# Patient Record
Sex: MALE | Race: WHITE | NOT HISPANIC OR LATINO
[De-identification: names, ages, dates, MRNs, and addresses within clinical notes are randomized per-mention and may not be internally consistent; named-entity substitution may affect disease eponyms.]

---

## 2021-02-19 PROBLEM — Z00.00 ENCOUNTER FOR PREVENTIVE HEALTH EXAMINATION: Status: ACTIVE | Noted: 2021-02-19

## 2021-02-22 DIAGNOSIS — N40.2 NODULAR PROSTATE W/OUT LOWER URINARY TRACT SYMPTOMS: ICD-10-CM

## 2021-02-22 DIAGNOSIS — Z86.39 PERSONAL HISTORY OF OTHER ENDOCRINE, NUTRITIONAL AND METABOLIC DISEASE: ICD-10-CM

## 2021-02-22 DIAGNOSIS — Z87.438 PERSONAL HISTORY OF OTHER DISEASES OF MALE GENITAL ORGANS: ICD-10-CM

## 2021-02-22 DIAGNOSIS — Z87.442 PERSONAL HISTORY OF URINARY CALCULI: ICD-10-CM

## 2021-02-22 DIAGNOSIS — K57.90 DIVERTICULOSIS OF INTESTINE, PART UNSPECIFIED, W/OUT PERFORATION OR ABSCESS W/OUT BLEEDING: ICD-10-CM

## 2021-03-02 ENCOUNTER — APPOINTMENT (OUTPATIENT)
Dept: INTERNAL MEDICINE | Facility: CLINIC | Age: 76
End: 2021-03-02
Payer: MEDICARE

## 2021-03-02 VITALS
DIASTOLIC BLOOD PRESSURE: 90 MMHG | HEIGHT: 68 IN | SYSTOLIC BLOOD PRESSURE: 156 MMHG | BODY MASS INDEX: 28.19 KG/M2 | WEIGHT: 186 LBS

## 2021-03-02 PROCEDURE — 99072 ADDL SUPL MATRL&STAF TM PHE: CPT

## 2021-03-02 PROCEDURE — 36415 COLL VENOUS BLD VENIPUNCTURE: CPT

## 2021-03-02 PROCEDURE — G0439: CPT

## 2021-03-02 NOTE — PHYSICAL EXAM
[No JVD] : no jugular venous distention [No Lymphadenopathy] : no lymphadenopathy [Clear to Auscultation] : lungs were clear to auscultation bilaterally [Normal Rate] : normal rate  [Regular Rhythm] : with a regular rhythm [No Carotid Bruits] : no carotid bruits [No Edema] : there was no peripheral edema [Non Tender] : non-tender [No Joint Swelling] : no joint swelling [No Focal Deficits] : no focal deficits [Normal] : affect was normal and insight and judgment were intact

## 2021-03-02 NOTE — REVIEW OF SYSTEMS
[Joint Pain] : joint pain [Fever] : no fever [Chills] : no chills [Chest Pain] : no chest pain [Palpitations] : no palpitations [Lower Ext Edema] : no lower extremity edema [Orthopena] : no orthopnea [Shortness Of Breath] : no shortness of breath [Abdominal Pain] : no abdominal pain [Diarrhea] : no diarrhea [Dysuria] : no dysuria [Muscle Weakness] : no muscle weakness [Joint Swelling] : no joint swelling [Dizziness] : no dizziness [Fainting] : no fainting

## 2021-03-02 NOTE — HISTORY OF PRESENT ILLNESS
[de-identified] : 76 y/o male presents for annual Medicare wellness visit, follow-up of hypertension and for fasting labs.\par He has been generally well without any recent illness.  He is not been able to check his blood pressure at home.\par He has follow-up scheduled with Dr. Lawton for BPH and also needs a PSA done today.

## 2021-03-02 NOTE — PLAN
[FreeTextEntry1] : His exam is unremarkable and unchanged except for elevated blood pressure.\par Hypertension–his blood pressure has been somewhat elevated over the past year and at this point we will increase Accupril to 40 mg daily and he will either try to have it checked at home outside the office will follow up in 3 months that are rechecked.\par \par Mild diabetes–follow-up A1c was sent.  Weight loss and diet were discussed.  Medication options were discussed as well if needed in the future.\par \par History of BPH–follow-up PSA is sent.  He will follow-up with his urologist as scheduled.\par \par He had a negative colonoscopy in June 2015.

## 2021-03-03 LAB
ALBUMIN SERPL ELPH-MCNC: 4.5 G/DL
ALP BLD-CCNC: 128 U/L
ALT SERPL-CCNC: 43 U/L
ANION GAP SERPL CALC-SCNC: 11 MMOL/L
AST SERPL-CCNC: 32 U/L
BASOPHILS # BLD AUTO: 0.07 K/UL
BASOPHILS NFR BLD AUTO: 1.1 %
BILIRUB SERPL-MCNC: 0.6 MG/DL
BUN SERPL-MCNC: 17 MG/DL
CALCIUM SERPL-MCNC: 9.7 MG/DL
CHLORIDE SERPL-SCNC: 102 MMOL/L
CO2 SERPL-SCNC: 23 MMOL/L
CREAT SERPL-MCNC: 0.93 MG/DL
EOSINOPHIL # BLD AUTO: 0.18 K/UL
EOSINOPHIL NFR BLD AUTO: 2.7 %
GLUCOSE SERPL-MCNC: 149 MG/DL
HCT VFR BLD CALC: 47.2 %
HGB BLD-MCNC: 15.5 G/DL
IMM GRANULOCYTES NFR BLD AUTO: 2 %
LYMPHOCYTES # BLD AUTO: 2.2 K/UL
LYMPHOCYTES NFR BLD AUTO: 33.2 %
MAN DIFF?: NORMAL
MCHC RBC-ENTMCNC: 31.6 PG
MCHC RBC-ENTMCNC: 32.8 GM/DL
MCV RBC AUTO: 96.3 FL
MONOCYTES # BLD AUTO: 0.85 K/UL
MONOCYTES NFR BLD AUTO: 12.8 %
NEUTROPHILS # BLD AUTO: 3.19 K/UL
NEUTROPHILS NFR BLD AUTO: 48.2 %
PLATELET # BLD AUTO: 240 K/UL
POTASSIUM SERPL-SCNC: 4.4 MMOL/L
PROT SERPL-MCNC: 7.6 G/DL
PSA SERPL-MCNC: 3.1 NG/ML
RBC # BLD: 4.9 M/UL
RBC # FLD: 13.3 %
SODIUM SERPL-SCNC: 136 MMOL/L
WBC # FLD AUTO: 6.62 K/UL

## 2021-03-04 LAB
ESTIMATED AVERAGE GLUCOSE: 157 MG/DL
HBA1C MFR BLD HPLC: 7.1 %

## 2021-04-24 LAB
CHOLEST SERPL-MCNC: 200 MG/DL
HDLC SERPL-MCNC: 35 MG/DL
LDLC SERPL CALC-MCNC: 117 MG/DL
NONHDLC SERPL-MCNC: 164 MG/DL
TRIGL SERPL-MCNC: 238 MG/DL

## 2021-06-01 ENCOUNTER — APPOINTMENT (OUTPATIENT)
Dept: INTERNAL MEDICINE | Facility: CLINIC | Age: 76
End: 2021-06-01
Payer: MEDICARE

## 2021-06-01 VITALS
SYSTOLIC BLOOD PRESSURE: 148 MMHG | HEIGHT: 68 IN | BODY MASS INDEX: 28.19 KG/M2 | DIASTOLIC BLOOD PRESSURE: 82 MMHG | WEIGHT: 186 LBS

## 2021-06-01 VITALS — DIASTOLIC BLOOD PRESSURE: 84 MMHG | SYSTOLIC BLOOD PRESSURE: 138 MMHG

## 2021-06-01 DIAGNOSIS — Z87.19 PERSONAL HISTORY OF OTHER DISEASES OF THE DIGESTIVE SYSTEM: ICD-10-CM

## 2021-06-01 PROCEDURE — 99072 ADDL SUPL MATRL&STAF TM PHE: CPT

## 2021-06-01 PROCEDURE — 99214 OFFICE O/P EST MOD 30 MIN: CPT | Mod: 25

## 2021-06-01 PROCEDURE — 93000 ELECTROCARDIOGRAM COMPLETE: CPT

## 2021-06-01 NOTE — DATA REVIEWED
[FreeTextEntry1] : EKG revealed normal sinus rhythm at 85/minute.  Right bundle branch block.  No acute changes

## 2021-06-01 NOTE — REVIEW OF SYSTEMS
[Negative] : Heme/Lymph [Fever] : no fever [Chills] : no chills [Chest Pain] : no chest pain [Palpitations] : no palpitations [Lower Ext Edema] : no lower extremity edema [Shortness Of Breath] : no shortness of breath [Abdominal Pain] : no abdominal pain [Muscle Weakness] : no muscle weakness [Joint Swelling] : no joint swelling [Headache] : no headache [Dizziness] : no dizziness [Fainting] : no fainting

## 2021-06-01 NOTE — PLAN
[FreeTextEntry1] : His exam is unremarkable and unchanged.\par \par Hypertension–his blood pressure is well controlled since increasing his Accupril to 40 mg daily at his last visit.  He will remain on this for the time being without any changes.\par \par Early diabetes- his last A1c from 3/2/2021 was 7.1.  It has slowly crept up over the past few years.  It will be rechecked once again in the near future and if remains above 7 the idea of starting medication was discussed.\par \par BPH–his most recent PSA was 3.1 and he symptomatically has been stable.  He follows with with his urologist on a regular basis and requires no further intervention at present.\par \par Carpal tunnel repair–his exam is unremarkable as well as his EKG.  It did reveal a new right bundle branch block but is unremarkable otherwise.\par No medical contraindications to surgery.

## 2021-06-01 NOTE — PHYSICAL EXAM
[No Acute Distress] : no acute distress [No Lymphadenopathy] : no lymphadenopathy [Clear to Auscultation] : lungs were clear to auscultation bilaterally [Normal Rate] : normal rate  [Regular Rhythm] : with a regular rhythm [Normal S1, S2] : normal S1 and S2 [No Carotid Bruits] : no carotid bruits [No Edema] : there was no peripheral edema [Non Tender] : non-tender [No Joint Swelling] : no joint swelling [No Focal Deficits] : no focal deficits

## 2021-06-01 NOTE — HISTORY OF PRESENT ILLNESS
[de-identified] : 74 y/o presents for medical evaluation prior to carpal tunnel repair on 6 2.\par He has been generally well without any recent illness.

## 2021-08-11 ENCOUNTER — RX RENEWAL (OUTPATIENT)
Age: 76
End: 2021-08-11

## 2021-08-23 ENCOUNTER — APPOINTMENT (OUTPATIENT)
Dept: INTERNAL MEDICINE | Facility: CLINIC | Age: 76
End: 2021-08-23
Payer: MEDICARE

## 2021-08-23 VITALS — SYSTOLIC BLOOD PRESSURE: 158 MMHG | DIASTOLIC BLOOD PRESSURE: 92 MMHG

## 2021-08-23 VITALS — WEIGHT: 185 LBS | BODY MASS INDEX: 28.04 KG/M2 | HEIGHT: 68 IN

## 2021-08-23 PROCEDURE — 99214 OFFICE O/P EST MOD 30 MIN: CPT | Mod: 25

## 2021-08-23 PROCEDURE — 36415 COLL VENOUS BLD VENIPUNCTURE: CPT

## 2021-08-23 NOTE — REVIEW OF SYSTEMS
[Fever] : no fever [Chest Pain] : no chest pain [Palpitations] : no palpitations [Shortness Of Breath] : no shortness of breath [Joint Pain] : joint pain [Headache] : no headache [Dizziness] : no dizziness

## 2021-08-23 NOTE — HISTORY OF PRESENT ILLNESS
[de-identified] : 74 y/o presents for follow up and fasting labs.\par He has a history of hypertension, mild diabetes, hyperlipidemia and BPH.\par He has a follow-up appointment with his urologist and needs a follow-up PSA done as well.  He has been feeling generally well without any recent illness.  He does not check his blood pressure at home at all.  His Accupril was increased to 40 mg in 3/21.

## 2021-08-23 NOTE — ASSESSMENT
[FreeTextEntry1] : Hypertension–his pressure remains elevated and he is going to start taking Norvasc 5 mg daily along with Accupril 40 mg daily.  He is going to follow-up in 1 month for blood pressure check and he was also encouraged to get a home blood pressure monitoring unit and also monitor his pressure there as well.\par \par Diabetes–follow-up A1c is sent his previous level in 3/21 was 7.1.  He was told that if it continues to rise medication might be necessary.\par \par History of BPH–if follow-up PSA is sent.  His most recent one was 3.1.  He has a follow-up appoint with his urologist scheduled.\par \par Hyperlipidemia–follow-up lipid profile since his most recent cholesterol was 200 with an LDL of 117.  He is presently on no medication and he was told that starting a statin might be some to consider especially because of the diabetes although it is mild.\par \par He has not received the Covid vaccine and various issues and concerns were discussed.  He was encouraged to get vaccinated.

## 2021-08-25 LAB
ALBUMIN SERPL ELPH-MCNC: 4.7 G/DL
ALP BLD-CCNC: 144 U/L
ALT SERPL-CCNC: 43 U/L
ANION GAP SERPL CALC-SCNC: 11 MMOL/L
AST SERPL-CCNC: 35 U/L
BASOPHILS # BLD AUTO: 0.07 K/UL
BASOPHILS NFR BLD AUTO: 1 %
BILIRUB SERPL-MCNC: 0.7 MG/DL
BUN SERPL-MCNC: 19 MG/DL
CALCIUM SERPL-MCNC: 9.6 MG/DL
CHLORIDE SERPL-SCNC: 101 MMOL/L
CHOLEST SERPL-MCNC: 197 MG/DL
CO2 SERPL-SCNC: 22 MMOL/L
CREAT SERPL-MCNC: 0.92 MG/DL
EOSINOPHIL # BLD AUTO: 0.2 K/UL
EOSINOPHIL NFR BLD AUTO: 2.8 %
ESTIMATED AVERAGE GLUCOSE: 169 MG/DL
GLUCOSE SERPL-MCNC: 147 MG/DL
HBA1C MFR BLD HPLC: 7.5 %
HCT VFR BLD CALC: 45.6 %
HDLC SERPL-MCNC: 30 MG/DL
HGB BLD-MCNC: 15.4 G/DL
IMM GRANULOCYTES NFR BLD AUTO: 0.3 %
LDLC SERPL CALC-MCNC: 103 MG/DL
LYMPHOCYTES # BLD AUTO: 2.54 K/UL
LYMPHOCYTES NFR BLD AUTO: 35.9 %
MAN DIFF?: NORMAL
MCHC RBC-ENTMCNC: 31.6 PG
MCHC RBC-ENTMCNC: 33.8 GM/DL
MCV RBC AUTO: 93.4 FL
MONOCYTES # BLD AUTO: 0.75 K/UL
MONOCYTES NFR BLD AUTO: 10.6 %
NEUTROPHILS # BLD AUTO: 3.5 K/UL
NEUTROPHILS NFR BLD AUTO: 49.4 %
NONHDLC SERPL-MCNC: 167 MG/DL
PLATELET # BLD AUTO: 274 K/UL
POTASSIUM SERPL-SCNC: 4.4 MMOL/L
PROT SERPL-MCNC: 7.4 G/DL
PSA SERPL-MCNC: 3.31 NG/ML
RBC # BLD: 4.88 M/UL
RBC # FLD: 13.2 %
SODIUM SERPL-SCNC: 135 MMOL/L
TRIGL SERPL-MCNC: 317 MG/DL
WBC # FLD AUTO: 7.08 K/UL

## 2021-09-17 ENCOUNTER — RX RENEWAL (OUTPATIENT)
Age: 76
End: 2021-09-17

## 2021-09-21 ENCOUNTER — APPOINTMENT (OUTPATIENT)
Dept: INTERNAL MEDICINE | Facility: CLINIC | Age: 76
End: 2021-09-21
Payer: MEDICARE

## 2021-09-21 VITALS — OXYGEN SATURATION: 98 % | DIASTOLIC BLOOD PRESSURE: 78 MMHG | SYSTOLIC BLOOD PRESSURE: 122 MMHG | HEART RATE: 91 BPM

## 2021-09-21 PROCEDURE — 99213 OFFICE O/P EST LOW 20 MIN: CPT

## 2021-09-21 RX ORDER — TAMSULOSIN HYDROCHLORIDE 0.4 MG/1
0.4 CAPSULE ORAL
Qty: 90 | Refills: 1 | Status: ACTIVE | COMMUNITY
Start: 2021-09-21

## 2021-09-21 NOTE — HISTORY OF PRESENT ILLNESS
[de-identified] : 76-year-old male presents for follow-up of his blood pressure.  At his last visit Norvasc 5 mg was added and is here today for follow-up and a recheck.  He has been having no problems with the medication.\par He also states that he recently had follow-up with his urologist and was started on Flomax 0.4 mg daily for BPH symptoms and the fact that he was not emptying completely.

## 2021-09-21 NOTE — REVIEW OF SYSTEMS
[Fever] : no fever [Chest Pain] : no chest pain [Palpitations] : no palpitations [Lower Ext Edema] : no lower extremity edema [Headache] : no headache [Dizziness] : no dizziness [Fainting] : no fainting

## 2021-09-21 NOTE — PHYSICAL EXAM
[No Acute Distress] : no acute distress [No JVD] : no jugular venous distention [Clear to Auscultation] : lungs were clear to auscultation bilaterally [Normal Rate] : normal rate  [Regular Rhythm] : with a regular rhythm [No Edema] : there was no peripheral edema

## 2021-09-21 NOTE — ASSESSMENT
[FreeTextEntry1] : Hypertension–his blood pressure now is well controlled.  He is going to be switched to Lotrel 5/40 daily in place of Norvasc and Accupril separately.  He will monitor his pressure intermittently when she starts new medication to make sure it remains well controlled.\par \par BPH/LUTS–he had recent follow-up with his urologist.  His PSA has been stable and he is now taking Flomax 0.4 mg daily.

## 2022-03-07 ENCOUNTER — NON-APPOINTMENT (OUTPATIENT)
Age: 77
End: 2022-03-07

## 2022-03-07 ENCOUNTER — APPOINTMENT (OUTPATIENT)
Dept: INTERNAL MEDICINE | Facility: CLINIC | Age: 77
End: 2022-03-07
Payer: MEDICARE

## 2022-03-07 VITALS
SYSTOLIC BLOOD PRESSURE: 120 MMHG | DIASTOLIC BLOOD PRESSURE: 62 MMHG | BODY MASS INDEX: 27.89 KG/M2 | WEIGHT: 184 LBS | HEIGHT: 68 IN

## 2022-03-07 DIAGNOSIS — Z86.16 PERSONAL HISTORY OF COVID-19: ICD-10-CM

## 2022-03-07 PROCEDURE — G0439: CPT

## 2022-03-07 PROCEDURE — 36415 COLL VENOUS BLD VENIPUNCTURE: CPT

## 2022-03-07 NOTE — REVIEW OF SYSTEMS
[Back Pain] : back pain [Fever] : no fever [Chills] : no chills [Recent Change In Weight] : ~T no recent weight change [Palpitations] : no palpitations [Lower Ext Edema] : no lower extremity edema [Shortness Of Breath] : no shortness of breath [Dyspnea on Exertion] : not dyspnea on exertion [Abdominal Pain] : no abdominal pain [Headache] : no headache [Dizziness] : no dizziness

## 2022-03-07 NOTE — ASSESSMENT
[FreeTextEntry1] : His exam is essentially unremarkable and unchanged.–His blood pressure is well controlled and for now he will continue with Lotrel 5/40 daily.\par \par Diabetes–fasting labs/A1c was sent. He was told if he continues to rise or remains where it is then may consider starting low-dose medication.\par \par Hyperlipidemia–follow-up fasting labs was sent.\par \par BPH–PSA is sent and he has follow-up scheduled with his urologist in 1 month.\par \par Back pain/spinal stenosis–he will be following with his pain specialist.\par \par Recent COVID–he tested positive but was asymptomatic.

## 2022-03-07 NOTE — PHYSICAL EXAM
[No Acute Distress] : no acute distress [No JVD] : no jugular venous distention [Clear to Auscultation] : lungs were clear to auscultation bilaterally [Normal Rate] : normal rate  [Regular Rhythm] : with a regular rhythm [Normal S1, S2] : normal S1 and S2 [No Edema] : there was no peripheral edema [Non Tender] : non-tender [No Joint Swelling] : no joint swelling [No Focal Deficits] : no focal deficits [Alert and Oriented x3] : oriented to person, place, and time

## 2022-03-07 NOTE — HISTORY OF PRESENT ILLNESS
[de-identified] : 75 y/o male presents for annual Medicare wellness visit and follow-up fasting labs.\par He has a history of hypertension, BPH and more recently mild diabetes. His A1c has slowly risen and at his last visit it was 7.5. He is not presently on any medication and has been trying to control with diet. His blood pressure medication was changed to Lotrel 5/40 at his last visit and his blood pressure has been well controlled. Has been having issues with ongoing back pain secondary to spinal stenosis and is following with a pain specialist. He has received 1 epidural a few months ago. He also tested positive for Covid in 2/22 but was asymptomatic.

## 2022-03-08 LAB
ALBUMIN SERPL ELPH-MCNC: 4.3 G/DL
ALP BLD-CCNC: 128 U/L
ALT SERPL-CCNC: 59 U/L
ANION GAP SERPL CALC-SCNC: 14 MMOL/L
AST SERPL-CCNC: 53 U/L
BASOPHILS # BLD AUTO: 0.09 K/UL
BASOPHILS NFR BLD AUTO: 1.4 %
BILIRUB SERPL-MCNC: 0.6 MG/DL
BUN SERPL-MCNC: 21 MG/DL
CALCIUM SERPL-MCNC: 9.7 MG/DL
CHLORIDE SERPL-SCNC: 102 MMOL/L
CHOLEST SERPL-MCNC: 187 MG/DL
CO2 SERPL-SCNC: 21 MMOL/L
CREAT SERPL-MCNC: 0.94 MG/DL
EGFR: 83 ML/MIN/1.73M2
EOSINOPHIL # BLD AUTO: 0.19 K/UL
EOSINOPHIL NFR BLD AUTO: 3 %
ESTIMATED AVERAGE GLUCOSE: 148 MG/DL
GLUCOSE SERPL-MCNC: 131 MG/DL
HBA1C MFR BLD HPLC: 6.8 %
HCT VFR BLD CALC: 44.5 %
HDLC SERPL-MCNC: 34 MG/DL
HGB BLD-MCNC: 14.5 G/DL
IMM GRANULOCYTES NFR BLD AUTO: 0.5 %
LDLC SERPL CALC-MCNC: 101 MG/DL
LYMPHOCYTES # BLD AUTO: 2.1 K/UL
LYMPHOCYTES NFR BLD AUTO: 33.4 %
MAN DIFF?: NORMAL
MCHC RBC-ENTMCNC: 31.3 PG
MCHC RBC-ENTMCNC: 32.6 GM/DL
MCV RBC AUTO: 96.1 FL
MONOCYTES # BLD AUTO: 0.74 K/UL
MONOCYTES NFR BLD AUTO: 11.8 %
NEUTROPHILS # BLD AUTO: 3.13 K/UL
NEUTROPHILS NFR BLD AUTO: 49.9 %
NONHDLC SERPL-MCNC: 153 MG/DL
PLATELET # BLD AUTO: 246 K/UL
POTASSIUM SERPL-SCNC: 4.5 MMOL/L
PROT SERPL-MCNC: 7.3 G/DL
PSA SERPL-MCNC: 3.25 NG/ML
RBC # BLD: 4.63 M/UL
RBC # FLD: 13.4 %
SODIUM SERPL-SCNC: 136 MMOL/L
TRIGL SERPL-MCNC: 260 MG/DL
WBC # FLD AUTO: 6.28 K/UL

## 2022-03-10 ENCOUNTER — RX RENEWAL (OUTPATIENT)
Age: 77
End: 2022-03-10

## 2022-08-15 ENCOUNTER — RX RENEWAL (OUTPATIENT)
Age: 77
End: 2022-08-15

## 2022-10-18 ENCOUNTER — NON-APPOINTMENT (OUTPATIENT)
Age: 77
End: 2022-10-18

## 2022-10-18 ENCOUNTER — APPOINTMENT (OUTPATIENT)
Dept: INTERNAL MEDICINE | Facility: CLINIC | Age: 77
End: 2022-10-18

## 2022-10-18 VITALS
HEIGHT: 68 IN | WEIGHT: 185 LBS | BODY MASS INDEX: 28.04 KG/M2 | SYSTOLIC BLOOD PRESSURE: 130 MMHG | DIASTOLIC BLOOD PRESSURE: 68 MMHG

## 2022-10-18 PROCEDURE — 99214 OFFICE O/P EST MOD 30 MIN: CPT | Mod: 25

## 2022-10-18 PROCEDURE — 36415 COLL VENOUS BLD VENIPUNCTURE: CPT

## 2022-10-18 NOTE — ASSESSMENT
[FreeTextEntry1] : Hypertension–his blood pressure remains well controlled on Lotrel 5/40 daily.\par \par Hyperlipidemia–follow-up lipid profile was sent.  Not presently on medication.\par \par Diabetes–follow-up A1c was sent.  His most recent reading from 3/22 had dropped to 6.8 from 7.5.  He is not presently on medication.\par \par BPH–follow-up PSA was sent.  He is presently on Flomax 0.4 mg abhay  He does follow regularly with his urologist.

## 2022-10-18 NOTE — HISTORY OF PRESENT ILLNESS
[de-identified] : 78 y/o male presents for follow up and fasting labs.\par He has a history of hypertension, BPH and mild diabetes.\par Has been generally well without any recent illness.  He still complains of some residual fatigue from having had COVID in 2/22.

## 2022-10-19 LAB
ALBUMIN SERPL ELPH-MCNC: 4.4 G/DL
ALP BLD-CCNC: 120 U/L
ALT SERPL-CCNC: 54 U/L
ANION GAP SERPL CALC-SCNC: 12 MMOL/L
AST SERPL-CCNC: 41 U/L
BASOPHILS # BLD AUTO: 0.09 K/UL
BASOPHILS NFR BLD AUTO: 1.2 %
BILIRUB SERPL-MCNC: 0.7 MG/DL
BUN SERPL-MCNC: 23 MG/DL
CALCIUM SERPL-MCNC: 9.7 MG/DL
CHLORIDE SERPL-SCNC: 102 MMOL/L
CHOLEST SERPL-MCNC: 219 MG/DL
CO2 SERPL-SCNC: 23 MMOL/L
CREAT SERPL-MCNC: 1.04 MG/DL
EGFR: 73 ML/MIN/1.73M2
EOSINOPHIL # BLD AUTO: 0.23 K/UL
EOSINOPHIL NFR BLD AUTO: 3.2 %
ESTIMATED AVERAGE GLUCOSE: 171 MG/DL
GLUCOSE SERPL-MCNC: 136 MG/DL
HBA1C MFR BLD HPLC: 7.6 %
HCT VFR BLD CALC: 43.6 %
HDLC SERPL-MCNC: 33 MG/DL
HGB BLD-MCNC: 14.5 G/DL
IMM GRANULOCYTES NFR BLD AUTO: 0.5 %
LDLC SERPL CALC-MCNC: 122 MG/DL
LYMPHOCYTES # BLD AUTO: 2.2 K/UL
LYMPHOCYTES NFR BLD AUTO: 30.2 %
MAN DIFF?: NORMAL
MCHC RBC-ENTMCNC: 31.6 PG
MCHC RBC-ENTMCNC: 33.3 GM/DL
MCV RBC AUTO: 95 FL
MONOCYTES # BLD AUTO: 0.67 K/UL
MONOCYTES NFR BLD AUTO: 9.2 %
NEUTROPHILS # BLD AUTO: 4.06 K/UL
NEUTROPHILS NFR BLD AUTO: 55.7 %
NONHDLC SERPL-MCNC: 186 MG/DL
PLATELET # BLD AUTO: 250 K/UL
POTASSIUM SERPL-SCNC: 4.7 MMOL/L
PROT SERPL-MCNC: 7.7 G/DL
PSA SERPL-MCNC: 2.67 NG/ML
RBC # BLD: 4.59 M/UL
RBC # FLD: 13.5 %
SODIUM SERPL-SCNC: 136 MMOL/L
TRIGL SERPL-MCNC: 321 MG/DL
WBC # FLD AUTO: 7.29 K/UL

## 2022-11-14 ENCOUNTER — RX RENEWAL (OUTPATIENT)
Age: 77
End: 2022-11-14

## 2022-11-18 ENCOUNTER — APPOINTMENT (OUTPATIENT)
Dept: ORTHOPEDIC SURGERY | Facility: CLINIC | Age: 77
End: 2022-11-18

## 2022-11-18 VITALS — HEIGHT: 68 IN | WEIGHT: 185 LBS | BODY MASS INDEX: 28.04 KG/M2

## 2022-11-18 DIAGNOSIS — Z78.9 OTHER SPECIFIED HEALTH STATUS: ICD-10-CM

## 2022-11-18 PROCEDURE — 99204 OFFICE O/P NEW MOD 45 MIN: CPT

## 2022-11-20 PROBLEM — Z78.9 NON-SMOKER: Status: ACTIVE | Noted: 2022-11-18

## 2022-11-20 NOTE — PHYSICAL EXAM
[Flexion] : flexion [Extension] : extension [5___] : right extensor hallicus longus 5[unfilled]/5 [Normal Coordination] : normal coordination [Normal DTR UE/LE] : normal DTR UE/LE  [Normal Sensation] : normal sensation [Normal Mood and Affect] : normal mood and affect [Orientated] : orientated [Able to Communicate] : able to communicate [Normal Skin] : normal skin [No Rash] : no rash [No Ulcers] : no ulcers [No Lesions] : no lesions [No obvious lymphadenopathy in areas examined] : no obvious lymphadenopathy in areas examined [Well Developed] : well developed [Well Nourished] : well nourished [Peripheral vascular exam is grossly normal] : peripheral vascular exam is grossly normal [No Respiratory Distress] : no respiratory distress [de-identified] : Constitutional:\par - General Appearance:\par Unremarkable\par Body Habitus\par Well Developed\par Well Nourished\par Body Habitus\par No Deformities\par Well Groomed\par Ability To communicate:\par Normal\par Neurologic:\par Global sensation is intact to upper and lower extremities. See examination of Neck and/or Spine\par for exceptions.\par Orientation to Time, Place and Person is: Normal\par Mood And Affect is Normal\par Skin:\par - Head/Face, Right Upper/Lower Extremity, Left Upper/Lower Extremity: Normal\par See Examination of Neck and/or Spine for exceptions\par Cardiovascular:\par Peripheral Cardiovascular System is Normal\par Palpation of Lymph Nodes:\par Normal Palpation of lymph nodes in: Axilla, Cervical, Inguinal\par Abnormal Palpation of lymph nodes in: None  [] : non-antalgic

## 2022-11-20 NOTE — HISTORY OF PRESENT ILLNESS
[de-identified] : 11/18/2022 - The patient is a 77 year male who presents today for an initial evaluation. Chief complaint is back pain.  Complains of low back pain, buttock pain, left anterior thigh pain, and b/l hip pain with ambulation. Symptomatic for 2 years. Denies right sided symptoms. Difficulty lifting the left leg due to weakness and pain. Unable to ambulate > 10 minutes. General health is good. \par Formal treatment with LESI, no significant relief. Treatment with pt for 3 months was helpful, but no long standing relief. Taking blood pressure medications, controlled BP. \par \par Date of Injury/Onset: chronic\par Pain:    At Rest: 5/10 \par With Activity:  7/10 \par Quality of symptoms: achy, cramping\par Improves with: rest\par Worse with: walking, bending, standing\par Prior treatment: epidural 1 year ago\par Prior Imaging: [facility] lumbar 07/07/2021\par Additional Information: None\par \par \par \par \par \par

## 2022-11-20 NOTE — DISCUSSION/SUMMARY
[de-identified] : Discussed and reviewed results of lumbar MRI, and pathology of his symptoms associated with L4-5 stenosis and anterolisthesis. Discussed treatment with medications, possible interventional spine injections vs surgical decompression dependent upon his response to conservative treatments. I am prescribing the patient MDP for pain relief. Titration schedule provided. I am prescribing the patient Meloxicam (15mg x7-10 days then 7.5mg up to BID PRN) for pain relief. Titration Schedule Provided. Counseled patient to complete MDP before initiating treatment with Meloxicam. At his next visit, we will discuss possible pain management consult for L4-5 Epidural Spine Injections for pain relief. F/u in 4-6 weeks. \par \par Prior to appointment and during encounter with patient extensive medical records were reviewed including but not limited to, hospital records, outpatient records, imaging results, and lab data.During this appointment the patient was examined, diagnoses were discussed and explained in a face to face manner. In addition extensive time was spent reviewing aforementioned diagnostic studies. Counseling including abnormal image results, differential diagnoses, treatment options, risk and benefits, lifestyle changes, current condition, and current medications was performed. Patient's comments, questions, and concerns were addressed and patient verbalized understanding. Based on this patient's presentation at our office, which is an orthopedic spine surgeon's office, this patient inherently / intrinsically has a risk, however minute, of developing issues such as Cauda equina syndrome, bowel and bladder changes, or progression of motor or neurological deficits such as paralysis which may be permanent.\par \par NESS FERNANDEZ Acting as a Scribe for Dr. Elizondo\dimple I, Ness Fernandez, attest that this documentation has been prepared under the direction and in the presence of Provider Maury Elizondo MD. \par \par \par

## 2022-12-30 ENCOUNTER — APPOINTMENT (OUTPATIENT)
Dept: ORTHOPEDIC SURGERY | Facility: CLINIC | Age: 77
End: 2022-12-30
Payer: MEDICARE

## 2022-12-30 VITALS — HEIGHT: 68 IN | WEIGHT: 185 LBS | BODY MASS INDEX: 28.04 KG/M2

## 2022-12-30 PROCEDURE — 99214 OFFICE O/P EST MOD 30 MIN: CPT

## 2022-12-30 NOTE — DISCUSSION/SUMMARY
[de-identified] : 76 y/o male with L4-5 stenosis and anterolisthesis. Discussed treatment with medications, possible interventional spine injections vs surgical decompression dependent upon his response to conservative treatments, however, he seems to be trending in a less symptomatic direction. Patient given referral for pain management consult to discuss L4-5 Epidural Spine Injections for pain relief. I am prescribing the patient Meloxicam (15mg x7-10 days then 7.5mg up to BID PRN) for pain relief. Titration Schedule Provided. I advised the patient to incorporate the exercises taught during PT into his daily routine. F/U in 4 weeks. \par \par Prior to appointment and during encounter with patient extensive medical records were reviewed including but not limited to, hospital records, outpatient records, imaging results, and lab data.During this appointment the patient was examined, diagnoses were discussed and explained in a face to face manner. In addition extensive time was spent reviewing aforementioned diagnostic studies. Counseling including abnormal image results, differential diagnoses, treatment options, risk and benefits, lifestyle changes, current condition, and current medications was performed. Patient's comments, questions, and concerns were addressed and patient verbalized understanding. Based on this patient's presentation at our office, which is an orthopedic spine surgeon's office, this patient inherently / intrinsically has a risk, however minute, of developing issues such as Cauda equina syndrome, bowel and bladder changes, or progression of motor or neurological deficits such as paralysis which may be permanent.\par \par NESS FERNANDEZ Acting as a Scribe for Dr. Martel I, Ness Fernandez, attest that this documentation has been prepared under the direction and in the presence of Provider Maury Elizondo MD. \par \par \par

## 2022-12-30 NOTE — PHYSICAL EXAM
[Normal Coordination] : normal coordination [Normal DTR UE/LE] : normal DTR UE/LE  [Normal Sensation] : normal sensation [Normal Mood and Affect] : normal mood and affect [Orientated] : orientated [Able to Communicate] : able to communicate [Normal Skin] : normal skin [No Rash] : no rash [No Ulcers] : no ulcers [No Lesions] : no lesions [No obvious lymphadenopathy in areas examined] : no obvious lymphadenopathy in areas examined [Well Developed] : well developed [Well Nourished] : well nourished [Peripheral vascular exam is grossly normal] : peripheral vascular exam is grossly normal [No Respiratory Distress] : no respiratory distress [5___] : right extensor hallicus longus 5[unfilled]/5 [de-identified] : Constitutional:\par - General Appearance:\par Unremarkable\par Body Habitus\par Well Developed\par Well Nourished\par Body Habitus\par No Deformities\par Well Groomed\par Ability To communicate:\par Normal\par Neurologic:\par Global sensation is intact to upper and lower extremities. See examination of Neck and/or Spine\par for exceptions.\par Orientation to Time, Place and Person is: Normal\par Mood And Affect is Normal\par Skin:\par - Head/Face, Right Upper/Lower Extremity, Left Upper/Lower Extremity: Normal\par See Examination of Neck and/or Spine for exceptions\par Cardiovascular:\par Peripheral Cardiovascular System is Normal\par Palpation of Lymph Nodes:\par Normal Palpation of lymph nodes in: Axilla, Cervical, Inguinal\par Abnormal Palpation of lymph nodes in: None  [] : non-antalgic

## 2022-12-30 NOTE — HISTORY OF PRESENT ILLNESS
[Lower back] : lower back [4] : 4 [Meds] : meds [de-identified] : 12/30/2022 - 78 y/o male presenting for a follow up of lumbar spine. Significantly improved since his last visit after completing MDP. Chief complaint is limited rom in the left lower extremity due to stiffness/pain, however, he states resolution of his sciatic pains with ambulating. Not actively using antiinflammatories. He completed PT, and is completing the exercises from pt into his daily routine. No difficulty with ambulating long distances. \par \par 11/18/2022 - The patient is a 77 year male who presents today for an initial evaluation. Chief complaint is back pain.  Complains of low back pain, buttock pain, left anterior thigh pain, and b/l hip pain with ambulation. Symptomatic for 2 years. Denies right sided symptoms. Difficulty lifting the left leg due to weakness and pain. Unable to ambulate > 10 minutes. General health is good. \par Formal treatment with LESI, no significant relief. Treatment with pt for 3 months was helpful, but no long standing relief. Taking blood pressure medications, controlled BP. \par \par Date of Injury/Onset: chronic\par Pain:    At Rest: 5/10 \par With Activity:  7/10 \par Quality of symptoms: achy, cramping\par Improves with: rest\par Worse with: walking, bending, standing\par Prior treatment: epidural 1 year ago\par Prior Imaging: [facility] lumbar 07/07/2021\par Additional Information: None\par \par \par \par \par \par  [de-identified] : M

## 2023-02-01 ENCOUNTER — APPOINTMENT (OUTPATIENT)
Dept: ORTHOPEDIC SURGERY | Facility: CLINIC | Age: 78
End: 2023-02-01
Payer: MEDICARE

## 2023-02-01 VITALS — HEIGHT: 68 IN | WEIGHT: 185 LBS | BODY MASS INDEX: 28.04 KG/M2

## 2023-02-01 PROCEDURE — 99214 OFFICE O/P EST MOD 30 MIN: CPT

## 2023-02-01 PROCEDURE — 73503 X-RAY EXAM HIP UNI 4/> VIEWS: CPT | Mod: LT

## 2023-02-01 NOTE — DISCUSSION/SUMMARY
[de-identified] : 78 y/o male with L4-5 stenosis and anterolisthesis. I discussed with the patient at length there is likely a component of both hip and lumbar pathology contributing to symptom complex. Referral given for consult with Dr. Mensah for evaluation of left hip, candidacy to [facility]. Discussed possible L4-5 Epidural Spine Injections for pain relief dependent upon updated MRI results. I am requesting an updated lumbar spine MRI to evaluate for stenosis, last study is approx 2 years old, progressive left radicular symptoms refractory to Meloxicam, MDP, chiropractic therapy, physical therapy over months duration. Patient will follow up after the study is complete. \par \par Prior to appointment and during encounter with patient extensive medical records were reviewed including but not limited to, hospital records, outpatient records, imaging results, and lab data.During this appointment the patient was examined, diagnoses were discussed and explained in a face to face manner. In addition extensive time was spent reviewing aforementioned diagnostic studies. Counseling including abnormal image results, differential diagnoses, treatment options, risk and benefits, lifestyle changes, current condition, and current medications was performed. Patient's comments, questions, and concerns were addressed and patient verbalized understanding. Based on this patient's presentation at our office, which is an orthopedic spine surgeon's office, this patient inherently / intrinsically has a risk, however minute, of developing issues such as Cauda equina syndrome, bowel and bladder changes, or progression of motor or neurological deficits such as paralysis which may be permanent.\par \par NESS FERNANDEZ Acting as a Scribe for Dr. Delonte CARRANZA, Ness Fernandez, attest that this documentation has been prepared under the direction and in the presence of Provider Maury Elizondo MD. \par \par \par

## 2023-02-01 NOTE — DATA REVIEWED
[MRI] : MRI [Lumbar Spine] : lumbar spine [Report was reviewed and noted in the chart] : The report was reviewed and noted in the chart [I independently reviewed and interpreted images and report] : I independently reviewed and interpreted images and report [I reviewed the films/CD and additionally noted] : I reviewed the films/CD and additionally noted [FreeTextEntry2] : 2020: L4-5 spondylolisthesis , lateral recess stenosis [FreeTextEntry1] : I stop paperwork reviewed

## 2023-02-01 NOTE — PHYSICAL EXAM
[Normal Coordination] : normal coordination [Normal DTR UE/LE] : normal DTR UE/LE  [Normal Sensation] : normal sensation [Normal Mood and Affect] : normal mood and affect [Orientated] : orientated [Able to Communicate] : able to communicate [Normal Skin] : normal skin [No Rash] : no rash [No Ulcers] : no ulcers [No Lesions] : no lesions [No obvious lymphadenopathy in areas examined] : no obvious lymphadenopathy in areas examined [Well Developed] : well developed [Well Nourished] : well nourished [Peripheral vascular exam is grossly normal] : peripheral vascular exam is grossly normal [No Respiratory Distress] : no respiratory distress [5___] : right extensor hallicus longus 5[unfilled]/5 [Left] : left hip with pelvis [de-identified] : Constitutional:\par - General Appearance:\par Unremarkable\par Body Habitus\par Well Developed\par Well Nourished\par Body Habitus\par No Deformities\par Well Groomed\par Ability To communicate:\par Normal\par Neurologic:\par Global sensation is intact to upper and lower extremities. See examination of Neck and/or Spine\par for exceptions.\par Orientation to Time, Place and Person is: Normal\par Mood And Affect is Normal\par Skin:\par - Head/Face, Right Upper/Lower Extremity, Left Upper/Lower Extremity: Normal\par See Examination of Neck and/or Spine for exceptions\par Cardiovascular:\par Peripheral Cardiovascular System is Normal\par Palpation of Lymph Nodes:\par Normal Palpation of lymph nodes in: Axilla, Cervical, Inguinal\par Abnormal Palpation of lymph nodes in: None  [] : non-antalgic [FreeTextEntry9] : Significant arthritis

## 2023-02-01 NOTE — HISTORY OF PRESENT ILLNESS
[Lower back] : lower back [4] : 4 [Meds] : meds [de-identified] : 2/1/2023 - 76 y/o M presenting for a follow up of lumbar spine. His chief complaint is progressive anterior left radiating thigh pain, stopping at the left knee. Increased pain while standing, no pain sitting. No right sided lower extremity symptoms. Chiropractor every few weeks. Pain level is about a 6-7/10. Stopped taking Meloxicam due to side affects. He has taken Advil at nighttime, difficulty with sleeping. No changes in lower extremity strength. Limited by bending over due to back pain. \par \par 12/30/2022 - 76 y/o male presenting for a follow up of lumbar spine. Significantly improved since his last visit after completing MDP. Chief complaint is limited rom in the left lower extremity due to stiffness/pain, however, he states resolution of his sciatic pains with ambulating. Not actively using antiinflammatories. He completed PT, and is completing the exercises from pt into his daily routine. No difficulty with ambulating long distances. \par \par 11/18/2022 - The patient is a 77 year male who presents today for an initial evaluation. Chief complaint is back pain.  Complains of low back pain, buttock pain, left anterior thigh pain, and b/l hip pain with ambulation. Symptomatic for 2 years. Denies right sided symptoms. Difficulty lifting the left leg due to weakness and pain. Unable to ambulate > 10 minutes. General health is good. \par Formal treatment with LESI, no significant relief. Treatment with pt for 3 months was helpful, but no long standing relief. Taking blood pressure medications, controlled BP. \par \par Date of Injury/Onset: chronic\par Pain:    At Rest: 5/10 \par With Activity:  7/10 \par Quality of symptoms: achy, cramping\par Improves with: rest\par Worse with: walking, bending, standing\par Prior treatment: epidural 1 year ago\par Prior Imaging: [facility] 07/07/2021\par Additional Information: None\par \par \par \par \par \par

## 2023-02-06 ENCOUNTER — FORM ENCOUNTER (OUTPATIENT)
Age: 78
End: 2023-02-06

## 2023-02-07 ENCOUNTER — APPOINTMENT (OUTPATIENT)
Dept: MRI IMAGING | Facility: CLINIC | Age: 78
End: 2023-02-07
Payer: MEDICARE

## 2023-02-07 PROCEDURE — 72148 MRI LUMBAR SPINE W/O DYE: CPT

## 2023-02-09 ENCOUNTER — APPOINTMENT (OUTPATIENT)
Dept: ORTHOPEDIC SURGERY | Facility: CLINIC | Age: 78
End: 2023-02-09
Payer: MEDICARE

## 2023-02-09 PROCEDURE — 99214 OFFICE O/P EST MOD 30 MIN: CPT

## 2023-02-09 RX ORDER — MELOXICAM 15 MG/1
15 TABLET ORAL
Qty: 30 | Refills: 1 | Status: ACTIVE | COMMUNITY
Start: 2023-02-09 | End: 1900-01-01

## 2023-02-09 NOTE — HISTORY OF PRESENT ILLNESS
[Retired] : Work status: retired [Gradual] : gradual [8] : 8 [5] : 5 [Dull/Aching] : dull/aching [Radiating] : radiating [Frequent] : frequent [Household chores] : household chores [Rest] : rest [Meds] : meds [Walking] : walking [Stairs] : stairs [de-identified] : 77M p/w L hip pain. Pain for a few years, complains of anterior hip and thigh pain, limited ROM, trouble with shoes and socks. Has not had any recent PT or NSAIDs, not sure if he would like any surgical intervention at this point. [] : no [FreeTextEntry1] : Left hip  [FreeTextEntry5] : on going pain [FreeTextEntry7] : L-spine  [FreeTextEntry9] : Ibuprofen  [de-identified] : 2/1/23 [de-identified] : Dr Elizondo  [de-identified] : x-rays [facility]

## 2023-02-09 NOTE — ASSESSMENT
[FreeTextEntry1] : 77M p/w adv L hip OA\par \par Begin PT\par Meloxicam for pain\par return 6-8 weeks\par \par The natural progression of Osteoarthritis was explained to the patient. We discussed the possible treatment options from conservative to operative. These included NSAIDS, Glucosamine and Chondrotin sulfate, and Physical Therapy. We also discussed that at some point they may progress to needed a LUCILA. Information and pamphlets were given.\par

## 2023-02-17 ENCOUNTER — APPOINTMENT (OUTPATIENT)
Dept: ORTHOPEDIC SURGERY | Facility: CLINIC | Age: 78
End: 2023-02-17
Payer: MEDICARE

## 2023-02-17 VITALS — BODY MASS INDEX: 28.04 KG/M2 | WEIGHT: 185 LBS | HEIGHT: 68 IN

## 2023-02-17 PROCEDURE — 99214 OFFICE O/P EST MOD 30 MIN: CPT

## 2023-02-17 NOTE — DISCUSSION/SUMMARY
[de-identified] : 76 y/o male with stenosis and anterolisthesis. Hip and lumbar pathology contributing to symptom complex, c/t to follow up with Dr. Mensah for evaluation of left hip, possible hip injection, candidacy to LUCILA. At this point, he seems to be trending in a less symptomatic direction with his current PT trial and ibuprofen as needed, will continue this course of treatment with supervised physical therapy. However, we discussed possible L4-5 Epidural Spine Injections for pain relief dependent upon if there is a return of radicular leg pain. I would like to follow up with the patient in 5-6 weeks to asses his response to PT, will discuss possible PM consult at next visit. \par \par Prior to appointment and during encounter with patient extensive medical records were reviewed including but not limited to, hospital records, outpatient records, imaging results, and lab data.During this appointment the patient was examined, diagnoses were discussed and explained in a face to face manner. In addition extensive time was spent reviewing aforementioned diagnostic studies. Counseling including abnormal image results, differential diagnoses, treatment options, risk and benefits, lifestyle changes, current condition, and current medications was performed. Patient's comments, questions, and concerns were addressed and patient verbalized understanding. Based on this patient's presentation at our office, which is an orthopedic spine surgeon's office, this patient inherently / intrinsically has a risk, however minute, of developing issues such as Cauda equina syndrome, bowel and bladder changes, or progression of motor or neurological deficits such as paralysis which may be permanent.\par \par NESS FERNANDEZ Acting as a Scribe for Dr. Elizondo\dimple I, Ness Fernandez, attest that this documentation has been prepared under the direction and in the presence of Provider Maury Elizondo MD. \par \par \par

## 2023-02-17 NOTE — HISTORY OF PRESENT ILLNESS
[Lower back] : lower back [4] : 4 [Meds] : meds [de-identified] : 02/17/2023 - 78 y/o M presenting for a test follow up of L spine. States transient relief of symptoms after completing two sessions of PT, significantly helpful. He states his leg pain was significant until starting PT. Medicine wise, he is occasionally using ibuprofen for symptom control at nighttime. \par \par 2/1/2023 - 78 y/o M presenting for a follow up of lumbar spine. His chief complaint is progressive anterior left radiating thigh pain, stopping at the left knee. Increased pain while standing, no pain sitting. No right sided lower extremity symptoms. Chiropractor every few weeks. Pain level is about a 6-7/10. Stopped taking Meloxicam due to side affects. He has taken Advil at nighttime, difficulty with sleeping. No changes in lower extremity strength. Limited by bending over due to back pain. \par \par 12/30/2022 - 78 y/o male presenting for a follow up of lumbar spine. Significantly improved since his last visit after completing MDP. Chief complaint is limited rom in the left lower extremity due to stiffness/pain, however, he states resolution of his sciatic pains with ambulating. Not actively using antiinflammatories. He completed PT, and is completing the exercises from pt into his daily routine. No difficulty with ambulating long distances. \par \par 11/18/2022 - The patient is a 77 year male who presents today for an initial evaluation. Chief complaint is back pain.  Complains of low back pain, buttock pain, left anterior thigh pain, and b/l hip pain with ambulation. Symptomatic for 2 years. Denies right sided symptoms. Difficulty lifting the left leg due to weakness and pain. Unable to ambulate > 10 minutes. General health is good. \par Formal treatment with LESI, no significant relief. Treatment with pt for 3 months was helpful, but no long standing relief. Taking blood pressure medications, controlled BP. \par \par Date of Injury/Onset: chronic\par Pain:    At Rest: 5/10 \par With Activity:  7/10 \par Quality of symptoms: achy, cramping\par Improves with: rest\par Worse with: walking, bending, standing\par Prior treatment: epidural 1 year ago\par Prior Imaging: [facility] 07/07/2021\par Additional Information: None\par \par \par \par \par \par

## 2023-02-17 NOTE — DATA REVIEWED
[Lumbar Spine] : lumbar spine [Report was reviewed and noted in the chart] : The report was reviewed and noted in the chart [I independently reviewed and interpreted images and report] : I independently reviewed and interpreted images and report [I reviewed the films/CD and additionally noted] : I reviewed the films/CD and additionally noted [FreeTextEntry1] : [facility] (02/07/2023) - L4-5 spondylolisthesis, facet arthrosis, mild/mod stenosis. L3-4: mild/mod stenosis.

## 2023-02-17 NOTE — PHYSICAL EXAM
[Normal Coordination] : normal coordination [Normal DTR UE/LE] : normal DTR UE/LE  [Normal Sensation] : normal sensation [Normal Mood and Affect] : normal mood and affect [Orientated] : orientated [Able to Communicate] : able to communicate [Normal Skin] : normal skin [No Rash] : no rash [No Ulcers] : no ulcers [No Lesions] : no lesions [No obvious lymphadenopathy in areas examined] : no obvious lymphadenopathy in areas examined [Well Developed] : well developed [Well Nourished] : well nourished [Peripheral vascular exam is grossly normal] : peripheral vascular exam is grossly normal [No Respiratory Distress] : no respiratory distress [5___] : right extensor hallicus longus 5[unfilled]/5 [Left] : left hip with pelvis [de-identified] : Constitutional:\par - General Appearance:\par Unremarkable\par Body Habitus\par Well Developed\par Well Nourished\par Body Habitus\par No Deformities\par Well Groomed\par Ability To communicate:\par Normal\par Neurologic:\par Global sensation is intact to upper and lower extremities. See examination of Neck and/or Spine\par for exceptions.\par Orientation to Time, Place and Person is: Normal\par Mood And Affect is Normal\par Skin:\par - Head/Face, Right Upper/Lower Extremity, Left Upper/Lower Extremity: Normal\par See Examination of Neck and/or Spine for exceptions\par Cardiovascular:\par Peripheral Cardiovascular System is Normal\par Palpation of Lymph Nodes:\par Normal Palpation of lymph nodes in: Axilla, Cervical, Inguinal\par Abnormal Palpation of lymph nodes in: None  [] : non-antalgic [FreeTextEntry9] : Significant arthritis

## 2023-03-13 ENCOUNTER — NON-APPOINTMENT (OUTPATIENT)
Age: 78
End: 2023-03-13

## 2023-03-13 ENCOUNTER — APPOINTMENT (OUTPATIENT)
Dept: INTERNAL MEDICINE | Facility: CLINIC | Age: 78
End: 2023-03-13
Payer: MEDICARE

## 2023-03-13 VITALS — BODY MASS INDEX: 28.43 KG/M2 | DIASTOLIC BLOOD PRESSURE: 78 MMHG | WEIGHT: 187 LBS | SYSTOLIC BLOOD PRESSURE: 128 MMHG

## 2023-03-13 DIAGNOSIS — N40.0 BENIGN PROSTATIC HYPERPLASIA WITHOUT LOWER URINARY TRACT SYMPMS: ICD-10-CM

## 2023-03-13 PROCEDURE — G0439: CPT

## 2023-03-13 PROCEDURE — 36415 COLL VENOUS BLD VENIPUNCTURE: CPT | Mod: 59

## 2023-03-13 RX ORDER — QUINAPRIL HYDROCHLORIDE 40 MG/1
40 TABLET, FILM COATED ORAL DAILY
Qty: 90 | Refills: 1 | Status: DISCONTINUED | COMMUNITY
Start: 2021-02-22 | End: 2023-03-13

## 2023-03-13 RX ORDER — AMLODIPINE BESYLATE 5 MG/1
5 TABLET ORAL DAILY
Qty: 90 | Refills: 0 | Status: DISCONTINUED | COMMUNITY
Start: 2021-08-23 | End: 2023-03-13

## 2023-03-13 NOTE — REVIEW OF SYSTEMS
[Joint Pain] : joint pain [Joint Stiffness] : joint stiffness [Back Pain] : back pain [Fever] : no fever [Chest Pain] : no chest pain [Palpitations] : no palpitations [Lower Ext Edema] : no lower extremity edema [Shortness Of Breath] : no shortness of breath [Dyspnea on Exertion] : not dyspnea on exertion [Dysuria] : no dysuria [Headache] : no headache [Dizziness] : no dizziness

## 2023-03-13 NOTE — PHYSICAL EXAM
[No Acute Distress] : no acute distress [No JVD] : no jugular venous distention [Clear to Auscultation] : lungs were clear to auscultation bilaterally [Normal Rate] : normal rate  [Regular Rhythm] : with a regular rhythm [Normal S1, S2] : normal S1 and S2 [No Murmur] : no murmur heard [No Edema] : there was no peripheral edema [Non Tender] : non-tender [Grossly Normal Strength/Tone] : grossly normal strength/tone [No Focal Deficits] : no focal deficits [Alert and Oriented x3] : oriented to person, place, and time

## 2023-03-13 NOTE — ASSESSMENT
[FreeTextEntry1] : Hypertension–his blood pressure remains well controlled and he will continue Lotrel 5/40 daily.\par \par Hyperlipidemia–follow-up fasting labs/lipid profile was sent.  He is not presently on medication.\par \par Diabetes–follow-up A1c was sent.  He is not on medication at present.  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  He remains on Flomax 0.4 mg daily.\par A follow-up PSA was sent.\par \par Left hip arthritis–he does have a follow-up scheduled with Dr. Elizondo and he is considering hip replacement.  He was told that from medical standpoint there would be no reason not to have it done.

## 2023-03-13 NOTE — HISTORY OF PRESENT ILLNESS
[de-identified] : 77-year-old male presents for annual Medicare wellness visit as well as follow-up fasting labs and prescription renewals.\par He has a history of hypertension, mild hyperlipidemia, early diabetes and BPH/PIN.  Also has been having ongoing issues with left hip arthritis and is contemplating hip replacement.  Also has spinal stenosis and does follow with his pain specialist.\par Has been generally well without any recent illness.\par He does remain active.

## 2023-03-13 NOTE — HEALTH RISK ASSESSMENT
[No] : In the past 12 months have you used drugs other than those required for medical reasons? No [No falls in past year] : Patient reported no falls in the past year [0] : 2) Feeling down, depressed, or hopeless: Not at all (0) [PHQ-2 Negative - No further assessment needed] : PHQ-2 Negative - No further assessment needed [Never] : Never [[id number]] : 0

## 2023-03-14 LAB
ALBUMIN SERPL ELPH-MCNC: 4.5 G/DL
ALP BLD-CCNC: 109 U/L
ALT SERPL-CCNC: 46 U/L
ANION GAP SERPL CALC-SCNC: 13 MMOL/L
AST SERPL-CCNC: 38 U/L
BASOPHILS # BLD AUTO: 0.1 K/UL
BASOPHILS NFR BLD AUTO: 1.5 %
BILIRUB SERPL-MCNC: 0.6 MG/DL
BUN SERPL-MCNC: 23 MG/DL
CALCIUM SERPL-MCNC: 9.8 MG/DL
CHLORIDE SERPL-SCNC: 101 MMOL/L
CHOLEST SERPL-MCNC: 203 MG/DL
CO2 SERPL-SCNC: 23 MMOL/L
CREAT SERPL-MCNC: 1.07 MG/DL
EGFR: 71 ML/MIN/1.73M2
EOSINOPHIL # BLD AUTO: 0.34 K/UL
EOSINOPHIL NFR BLD AUTO: 4.9 %
ESTIMATED AVERAGE GLUCOSE: 169 MG/DL
GLUCOSE SERPL-MCNC: 151 MG/DL
HBA1C MFR BLD HPLC: 7.5 %
HCT VFR BLD CALC: 46.5 %
HDLC SERPL-MCNC: 33 MG/DL
HGB BLD-MCNC: 15 G/DL
IMM GRANULOCYTES NFR BLD AUTO: 0.4 %
LDLC SERPL CALC-MCNC: 111 MG/DL
LYMPHOCYTES # BLD AUTO: 2.09 K/UL
LYMPHOCYTES NFR BLD AUTO: 30.4 %
MAN DIFF?: NORMAL
MCHC RBC-ENTMCNC: 31.7 PG
MCHC RBC-ENTMCNC: 32.3 GM/DL
MCV RBC AUTO: 98.3 FL
MONOCYTES # BLD AUTO: 0.75 K/UL
MONOCYTES NFR BLD AUTO: 10.9 %
NEUTROPHILS # BLD AUTO: 3.56 K/UL
NEUTROPHILS NFR BLD AUTO: 51.9 %
NONHDLC SERPL-MCNC: 171 MG/DL
PLATELET # BLD AUTO: 267 K/UL
POTASSIUM SERPL-SCNC: 4.8 MMOL/L
PROT SERPL-MCNC: 7.8 G/DL
PSA SERPL-MCNC: 3 NG/ML
RBC # BLD: 4.73 M/UL
RBC # FLD: 13.3 %
SODIUM SERPL-SCNC: 137 MMOL/L
TRIGL SERPL-MCNC: 300 MG/DL
WBC # FLD AUTO: 6.87 K/UL

## 2023-03-31 ENCOUNTER — APPOINTMENT (OUTPATIENT)
Dept: ORTHOPEDIC SURGERY | Facility: CLINIC | Age: 78
End: 2023-03-31
Payer: MEDICARE

## 2023-03-31 VITALS — HEIGHT: 68 IN | BODY MASS INDEX: 28.34 KG/M2 | WEIGHT: 187 LBS

## 2023-03-31 DIAGNOSIS — M48.061 SPINAL STENOSIS, LUMBAR REGION WITHOUT NEUROGENIC CLAUDICATION: ICD-10-CM

## 2023-03-31 PROCEDURE — 99214 OFFICE O/P EST MOD 30 MIN: CPT

## 2023-03-31 NOTE — DATA REVIEWED
[MRI] : MRI [Lumbar Spine] : lumbar spine [Report was reviewed and noted in the chart] : The report was reviewed and noted in the chart [Outside X-rays] : outside x-rays [Bilateral] : of the bilateral [Hip] : hip [I independently reviewed and interpreted images and report] : I independently reviewed and interpreted images and report [I reviewed the films/CD and additionally noted] : I reviewed the films/CD and additionally noted [FreeTextEntry2] : severe osteoarthritis left hip [FreeTextEntry1] : Adult reconstruction progress notes reviewed

## 2023-03-31 NOTE — HISTORY OF PRESENT ILLNESS
[de-identified] : 3/31/23- Patient presenting for a follow up of L spine. He reports increased severity of pain since his last visit. Chief complaint is radicular pain anterior region of LT thigh. Taking ibuprofen q.d for symptom control. C/t to follow up with  for left hip. \par Patient reports going to 6x PT session with no relief, continues to experience pain in his left leg. He reports zero relief from  L4/5 DONITA. \par Pain: At Rest: 2-3/10 \par With Activity: 6/10 \par \par 02/17/2023 - 78 y/o M presenting for a test follow up of L spine. States transient relief of symptoms after completing two sessions of PT, significantly helpful. He states his leg pain was significant until starting PT. Medicine wise, he is occasionally using ibuprofen for symptom control at nighttime. \par \par 2/1/2023 - 78 y/o M presenting for a follow up of lumbar spine. His chief complaint is progressive anterior left radiating thigh pain, stopping at the left knee. Increased pain while standing, no pain sitting. No right sided lower extremity symptoms. Chiropractor every few weeks. Pain level is about a 6-7/10. Stopped taking Meloxicam due to side affects. He has taken Advil at nighttime, difficulty with sleeping. No changes in lower extremity strength. Limited by bending over due to back pain. \par \par 12/30/2022 - 78 y/o male presenting for a follow up of lumbar spine. Significantly improved since his last visit after completing MDP. Chief complaint is limited rom in the left lower extremity due to stiffness/pain, however, he states resolution of his sciatic pains with ambulating. Not actively using antiinflammatories. He completed PT, and is completing the exercises from pt into his daily routine. No difficulty with ambulating long distances. \par \par 11/18/2022 - The patient is a 77 year male who presents today for an initial evaluation. Chief complaint is back pain. Complains of low back pain, buttock pain, left anterior thigh pain, and b/l hip pain with ambulation. Symptomatic for 2 years. Denies right sided symptoms. Difficulty lifting the left leg due to weakness and pain. Unable to ambulate > 10 minutes. General health is good. \par Formal treatment with LESI, no significant relief. Treatment with pt for 3 months was helpful, but no long standing relief. Taking blood pressure medications, controlled BP. \par \par Date of Injury/Onset: chronic\par Pain: At Rest: 5/10 \par With Activity: 7/10 \par Quality of symptoms: achy, cramping\par Improves with: rest\par Worse with: walking, bending, standing\par Prior treatment: epidural 1 year ago\par Prior Imaging: [facility] 07/07/2021\par Additional Information: None\par

## 2023-03-31 NOTE — DISCUSSION/SUMMARY
[de-identified] : 78 y/o male with stenosis and anterolisthesis. Hip and lumbar pathology contributing to symptom complex, c/t to follow up with Dr. Mensah for evaluation of left hip, possible hip injection, candidacy to LUCILA. He reports zero relief following L4-5 Epidural Spine Injection, we discussed intraarticular left hip injection as next course of treatment. He was provided with a referral for PM consult. I think his anterior thigh pain is likely to be generated from hip OA and would anticipate some short term relief from intra articular hoip injection and suggested that if so he should consider LUCILA surgery.\par \par Prior to appointment and during encounter with patient extensive medical records were reviewed including but not limited to, hospital records, outpatient records, imaging results, and lab data.During this appointment the patient was examined, diagnoses were discussed and explained in a face to face manner. In addition extensive time was spent reviewing aforementioned diagnostic studies. Counseling including abnormal image results, differential diagnoses, treatment options, risk and benefits, lifestyle changes, current condition, and current medications was performed. Patient's comments, questions, and concerns were addressed and patient verbalized understanding. Based on this patient's presentation at our office, which is an orthopedic spine surgeon's office, this patient inherently / intrinsically has a risk, however minute, of developing issues such as Cauda equina syndrome, bowel and bladder changes, or progression of motor or neurological deficits such as paralysis which may be permanent.\par \par NESS SOSA Acting as a Scribe for Ness Ramirez, attest that this documentation has been prepared under the direction and in the presence of Provider Maury Elizondo MD. \par \par \par

## 2023-03-31 NOTE — PHYSICAL EXAM
[Normal Coordination] : normal coordination [Normal DTR UE/LE] : normal DTR UE/LE  [Normal Sensation] : normal sensation [Normal Mood and Affect] : normal mood and affect [Orientated] : orientated [Able to Communicate] : able to communicate [Normal Skin] : normal skin [No Rash] : no rash [No Ulcers] : no ulcers [No Lesions] : no lesions [No obvious lymphadenopathy in areas examined] : no obvious lymphadenopathy in areas examined [Well Developed] : well developed [Well Nourished] : well nourished [Peripheral vascular exam is grossly normal] : peripheral vascular exam is grossly normal [No Respiratory Distress] : no respiratory distress [5___] : right extensor hallicus longus 5[unfilled]/5 [Left] : left hip with pelvis [de-identified] : Constitutional:\par - General Appearance:\par Unremarkable\par Body Habitus\par Well Developed\par Well Nourished\par Body Habitus\par No Deformities\par Well Groomed\par Ability To communicate:\par Normal\par Neurologic:\par Global sensation is intact to upper and lower extremities. See examination of Neck and/or Spine\par for exceptions.\par Orientation to Time, Place and Person is: Normal\par Mood And Affect is Normal\par Skin:\par - Head/Face, Right Upper/Lower Extremity, Left Upper/Lower Extremity: Normal\par See Examination of Neck and/or Spine for exceptions\par Cardiovascular:\par Peripheral Cardiovascular System is Normal\par Palpation of Lymph Nodes:\par Normal Palpation of lymph nodes in: Axilla, Cervical, Inguinal\par Abnormal Palpation of lymph nodes in: None  [] : non-antalgic [FreeTextEntry9] : Significant arthritis

## 2023-04-27 ENCOUNTER — APPOINTMENT (OUTPATIENT)
Dept: ORTHOPEDIC SURGERY | Facility: CLINIC | Age: 78
End: 2023-04-27
Payer: MEDICARE

## 2023-04-27 PROCEDURE — 99215 OFFICE O/P EST HI 40 MIN: CPT

## 2023-05-01 ENCOUNTER — APPOINTMENT (OUTPATIENT)
Dept: PAIN MANAGEMENT | Facility: CLINIC | Age: 78
End: 2023-05-01

## 2023-05-30 ENCOUNTER — FORM ENCOUNTER (OUTPATIENT)
Age: 78
End: 2023-05-30

## 2023-06-15 NOTE — DISCUSSION/SUMMARY
[de-identified] : The patient was advised of the diagnosis.  The natural history of the pathology was explained in full to the patient in layman's terms. All questions were answered.  The risks and benefits of surgical and non-surgical treatment alternatives were explained in full to the patient.\par \par We discussed my findings and the natural history of their condition. We talked about the details of the proposed surgery and the recovery. We discussed the material risks, possible benefits and alternatives to surgery. The risks include but are not limited to infection, bleeding and possible need for blood transfusion, fracture, bowel blockage, bladder retention or infection, need for reoperation, stiffness and/or limited range of motion, possible damage to nerves and blood vessels, failure of fixation of components, risk of deep vein thromboses and pulmonary embolism, wound healing problems, dislocation, and possible leg length discrepancy. Although incredibly rare, we also discussed the risks of a cardiac event, stroke and even death during, or following, the surgery. We discussed the type of implants the patient will be receiving and the type of fixation that will be used, as well as whether a robot or computer navigation aide will be used. The patient understands they will need medical clearance and will attend a preoperative joint education class. We also discussed the type of anesthesia they will receive, and the risks associated with hospital or rehab length of stay, obesity, diabetes and smoking.\par  \par \par Entered by Inge Benavides PA-C working under the supervision of Shelton Mensah MD. \par

## 2023-06-15 NOTE — HISTORY OF PRESENT ILLNESS
[8] : 8 [1] : 2 [Meds] : meds [de-identified] : 77M p/w L hip pain. Pain for a few years, complains of anterior hip and thigh pain, limited ROM, trouble with shoes and socks. Has not had any recent PT or NSAIDs, not sure if he would like any surgical intervention at this point.\par \par 4/27/23: Here for f/up L hip. Was unable to tolerate Mobic as it had irritated his stomach. PT had worsened his pain. Ibuprofen provides mild relief.  [] : no [FreeTextEntry1] : left hip  [FreeTextEntry9] : Ibuprofen  [de-identified] : PT- did not help

## 2023-06-15 NOTE — ASSESSMENT
[FreeTextEntry1] : 77M p/w adv L hip OA\par \par Discussed anti-inflammatories, PT/HEP, IA hip CSI, and LUCILA. Discussed LUCILA, r/b/a, and preop/postop periods in detail. He is well informed and would like to proceed with L LUCILA, r b a explained, we will call to schedule

## 2023-06-28 ENCOUNTER — OUTPATIENT (OUTPATIENT)
Dept: OUTPATIENT SERVICES | Facility: HOSPITAL | Age: 78
LOS: 1 days | Discharge: HOME | End: 2023-06-28
Payer: MEDICARE

## 2023-06-28 VITALS
DIASTOLIC BLOOD PRESSURE: 83 MMHG | HEIGHT: 67 IN | HEART RATE: 100 BPM | RESPIRATION RATE: 18 BRPM | SYSTOLIC BLOOD PRESSURE: 143 MMHG | WEIGHT: 188.94 LBS | OXYGEN SATURATION: 96 % | TEMPERATURE: 99 F

## 2023-06-28 DIAGNOSIS — M16.12 UNILATERAL PRIMARY OSTEOARTHRITIS, LEFT HIP: ICD-10-CM

## 2023-06-28 DIAGNOSIS — Z01.818 ENCOUNTER FOR OTHER PREPROCEDURAL EXAMINATION: ICD-10-CM

## 2023-06-28 DIAGNOSIS — Z98.890 OTHER SPECIFIED POSTPROCEDURAL STATES: Chronic | ICD-10-CM

## 2023-06-28 DIAGNOSIS — I10 ESSENTIAL (PRIMARY) HYPERTENSION: ICD-10-CM

## 2023-06-28 DIAGNOSIS — N40.0 BENIGN PROSTATIC HYPERPLASIA WITHOUT LOWER URINARY TRACT SYMPTOMS: ICD-10-CM

## 2023-06-28 LAB
A1C WITH ESTIMATED AVERAGE GLUCOSE RESULT: 7.6 % — HIGH (ref 4–5.6)
ALBUMIN SERPL ELPH-MCNC: 4.1 G/DL — SIGNIFICANT CHANGE UP (ref 3.3–5)
ALP SERPL-CCNC: 114 U/L — SIGNIFICANT CHANGE UP (ref 40–120)
ALT FLD-CCNC: 74 U/L — SIGNIFICANT CHANGE UP (ref 12–78)
ANION GAP SERPL CALC-SCNC: 9 MMOL/L — SIGNIFICANT CHANGE UP (ref 5–17)
APTT BLD: 30.4 SEC — SIGNIFICANT CHANGE UP (ref 27.5–35.5)
AST SERPL-CCNC: 47 U/L — HIGH (ref 15–37)
BASOPHILS # BLD AUTO: 0.07 K/UL — SIGNIFICANT CHANGE UP (ref 0–0.2)
BASOPHILS NFR BLD AUTO: 1.1 % — SIGNIFICANT CHANGE UP (ref 0–2)
BILIRUB SERPL-MCNC: 0.8 MG/DL — SIGNIFICANT CHANGE UP (ref 0.2–1.2)
BLD GP AB SCN SERPL QL: SIGNIFICANT CHANGE UP
BUN SERPL-MCNC: 22 MG/DL — SIGNIFICANT CHANGE UP (ref 7–23)
CALCIUM SERPL-MCNC: 9.7 MG/DL — SIGNIFICANT CHANGE UP (ref 8.5–10.1)
CHLORIDE SERPL-SCNC: 105 MMOL/L — SIGNIFICANT CHANGE UP (ref 96–108)
CO2 SERPL-SCNC: 23 MMOL/L — SIGNIFICANT CHANGE UP (ref 22–31)
CREAT SERPL-MCNC: 1.2 MG/DL — SIGNIFICANT CHANGE UP (ref 0.5–1.3)
EGFR: 62 ML/MIN/1.73M2 — SIGNIFICANT CHANGE UP
EOSINOPHIL # BLD AUTO: 0.18 K/UL — SIGNIFICANT CHANGE UP (ref 0–0.5)
EOSINOPHIL NFR BLD AUTO: 2.8 % — SIGNIFICANT CHANGE UP (ref 0–6)
ESTIMATED AVERAGE GLUCOSE: 171 MG/DL — HIGH (ref 68–114)
GLUCOSE SERPL-MCNC: 153 MG/DL — HIGH (ref 70–99)
HCT VFR BLD CALC: 43.5 % — SIGNIFICANT CHANGE UP (ref 39–50)
HGB BLD-MCNC: 15 G/DL — SIGNIFICANT CHANGE UP (ref 13–17)
IMM GRANULOCYTES NFR BLD AUTO: 0.5 % — SIGNIFICANT CHANGE UP (ref 0–0.9)
INR BLD: 1.09 RATIO — SIGNIFICANT CHANGE UP (ref 0.88–1.16)
LYMPHOCYTES # BLD AUTO: 2.2 K/UL — SIGNIFICANT CHANGE UP (ref 1–3.3)
LYMPHOCYTES # BLD AUTO: 34.2 % — SIGNIFICANT CHANGE UP (ref 13–44)
MCHC RBC-ENTMCNC: 31.6 PG — SIGNIFICANT CHANGE UP (ref 27–34)
MCHC RBC-ENTMCNC: 34.5 G/DL — SIGNIFICANT CHANGE UP (ref 32–36)
MCV RBC AUTO: 91.6 FL — SIGNIFICANT CHANGE UP (ref 80–100)
MONOCYTES # BLD AUTO: 0.77 K/UL — SIGNIFICANT CHANGE UP (ref 0–0.9)
MONOCYTES NFR BLD AUTO: 12 % — SIGNIFICANT CHANGE UP (ref 2–14)
MRSA PCR RESULT.: SIGNIFICANT CHANGE UP
NEUTROPHILS # BLD AUTO: 3.19 K/UL — SIGNIFICANT CHANGE UP (ref 1.8–7.4)
NEUTROPHILS NFR BLD AUTO: 49.4 % — SIGNIFICANT CHANGE UP (ref 43–77)
NRBC # BLD: 0 /100 WBCS — SIGNIFICANT CHANGE UP (ref 0–0)
PLATELET # BLD AUTO: 257 K/UL — SIGNIFICANT CHANGE UP (ref 150–400)
POTASSIUM SERPL-MCNC: 3.8 MMOL/L — SIGNIFICANT CHANGE UP (ref 3.5–5.3)
POTASSIUM SERPL-SCNC: 3.8 MMOL/L — SIGNIFICANT CHANGE UP (ref 3.5–5.3)
PROT SERPL-MCNC: 8.5 GM/DL — HIGH (ref 6–8.3)
PROTHROM AB SERPL-ACNC: 13 SEC — SIGNIFICANT CHANGE UP (ref 10.5–13.4)
RBC # BLD: 4.75 M/UL — SIGNIFICANT CHANGE UP (ref 4.2–5.8)
RBC # FLD: 13.2 % — SIGNIFICANT CHANGE UP (ref 10.3–14.5)
S AUREUS DNA NOSE QL NAA+PROBE: DETECTED
SODIUM SERPL-SCNC: 137 MMOL/L — SIGNIFICANT CHANGE UP (ref 135–145)
WBC # BLD: 6.44 K/UL — SIGNIFICANT CHANGE UP (ref 3.8–10.5)
WBC # FLD AUTO: 6.44 K/UL — SIGNIFICANT CHANGE UP (ref 3.8–10.5)

## 2023-06-28 PROCEDURE — 93010 ELECTROCARDIOGRAM REPORT: CPT

## 2023-06-28 RX ORDER — SODIUM CHLORIDE 9 MG/ML
3 INJECTION INTRAMUSCULAR; INTRAVENOUS; SUBCUTANEOUS EVERY 8 HOURS
Refills: 0 | Status: DISCONTINUED | OUTPATIENT
Start: 2023-07-18 | End: 2023-07-18

## 2023-06-28 RX ORDER — SODIUM CHLORIDE 9 MG/ML
1000 INJECTION, SOLUTION INTRAVENOUS
Refills: 0 | Status: DISCONTINUED | OUTPATIENT
Start: 2023-07-18 | End: 2023-07-18

## 2023-06-28 NOTE — PHYSICAL THERAPY INITIAL EVALUATION ADULT - PERTINENT HX OF CURRENT PROBLEM, REHAB EVAL
Patient attends pre-op testing today following consult c Dr. Mensah due to chronic pain to left hip. Elective L LUCILA(anterior) is now scheduled in this facility for 7/18/2023.

## 2023-06-28 NOTE — H&P PST ADULT - NSICDXPASTMEDICALHX_GEN_ALL_CORE_FT
PAST MEDICAL HISTORY:  BPH (benign prostatic hyperplasia)     Hypertension     Osteoarthritis of left hip

## 2023-06-28 NOTE — PHYSICAL THERAPY INITIAL EVALUATION ADULT - ADDITIONAL COMMENTS
Nephrology  Patient: Pallavi Benton Date:2020   : 1952 Attending: Dharmesh Horton MD   68 year old female        Chief complaint: leg swelling and pain     HPI from initial consult:   This is a 68 year old female with a past medical history significant for ESRD on PD, hypothyroidism, HTN, DM II, COPD, bipolar disorder who is being admitted after presenting with LE edema and pain. She has had work up recently for this, CT of femur unremarkable  (soft tissue swelling). In ED labs show Na 129, CO2 20, , WBC 17, Hgb 8.4. She was set up to receive 6hours of PD overnight.     Nephrology consulted for assistance managing ESRD on PD. Under the care of Dr. ARTUR Reese. Uses [facility], 7x/week. She states she is complaint with her treatments, however she \"gets depressed\" if she does anything longer than 4.5 hours. Only sleeps 5 hours a night. She definitely does not want to go back to hemodialysis. Today endorses a little SOB, no CP, fever, chill, abd pain, n/v, headache, dizziness.     Interval History:   : Tolerated 3.8Kg off with PD overnight. Feeling a little better. No SOB, n/v.   : Legs still hurt, only 1.3L off with PD last night.   : 1L off with PD yesterday. BUN back up to 118 today. No n/v, abd pain.   : 1 L off with PD yesterday, d/w PD RN, no issues with PD last night, BUN still up to 126. Hb 7 from 7.5  Leg pains still, adamant about not going back to HD  20 : Pt not happy with her current PD schedule, explained the need for her current PD therapy. I am unsure if she will follow what is required for efficient and required dialysis prescription.   : BUN up to 124, 1.5L off with HD. Laying in bed without gown on, and states \"not feeling well\". No SOB, CP, abd pain. No BM in 5 days.   : , K 5.0. Eating breakfast in chair, \"feels numb, no emotion\". Had large BM later this morning per RN.   : Sleeping, PD removed 4.7L total from yesterday. BP 94/46. Down  2kg.   9/24: Not feeling well, wants to \"go to sleep\" as in \"die\". Hypotesnive again this morning, PD 3.6L off.     Past Medical History:   Diagnosis Date   • A-V fistula (CMS/HCC)     left arm   • Acute renal failure (CMS/HCC) 01/18/2019   • Anemia 01/18/2019   • Anxiety    • Arthritis     knees & hands   • Bipolar disorder (CMS/HCC)    • Bronchitis    • Chronic kidney disease, stage III (moderate) (CMS/HCC)    • Colon polyp 2007   • COPD (chronic obstructive pulmonary disease) (CMS/HCC)     4/30/2018: \"one  told me I have COPD\"   • Diabetes mellitus (CMS/HCC) 2000    type 2 on insulin    • Diverticulosis    • Dry eye syndrome    • Fall 07/06/2018    Bruise above left eye, lip, and left Hand   • Fracture     broken left leg as a child   • Hemodialysis patient (CMS/HCC) started dialysis end of Jan 2019    Tue-Thur-Sat at [address] and [place] // Jan 2019 to 3/2019 - went to home Peritoneal dialysis 3/2019   • High cholesterol    • History of GI bleed 01/18/2019   • Hypertension    • Hypothyroid 1985   • Internal hemorrhoids    • Major depressive disorder    • SONYA (obstructive sleep apnea) 9/24/2020   • Peritoneal dialysis status (CMS/HCC) start 3/2019   • Pneumonia 01/18/2019   • Pseudophakia of both eyes    • Seasonal allergies    • Sinusitis, chronic    • Snoring    • Tobacco use     quit 12/10/12 per pt   • 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    s/p low anterior resection   • Vitamin D deficiency    • Wears reading eyeglasses        Past Surgical History:   Procedure Laterality Date   • Appendectomy     • Av fistula placement Left 05/30/2018     No BPs, lab draws, or IVs Left Arm   • Colon surgery  approx age early 60s    villous tumor of rectal area, surgical resection   • Colonoscopy diagnostic  07/01/2018    repeat July 2021   • Colonoscopy w/ polypectomy  05/26/2009    diverticulosis, colon polyps X 2   • Colonoscopy w/ polypectomy  06/21/2007    polyps X 2   • Colonoscopy w/ polypectomy   2006    large sessile polyp probably villous adenoma- too large to remove endoscopically. Benign polyps   • Coronary stent placement  2020    RCA stenosis with ulcerated plaque s/p 3.5 x 18 mm Resolute ISABEL   • Dialysis catheter insertion  2019    Laparoscopic   • Esophagogastroduodenoscopy  2019    Hemoclip applied to bleed   • Eye surgery Bilateral prior to     lasik   • Joint replacement Left -approx    left total knee replacement   • Joint replacement Right -approx    right total knee replacement   • Pacemaker implant  2020    Micra single-chamber leadless pacemaker for complete heart block   • Rotator cuff repair Left approx age 60s    left shoulder   • Tonsillectomy and adenoidectomy  childhood   • [place] tooth extraction  age 20s       Social History     Socioeconomic History   • Marital status:      Spouse name: Not on file   • Number of children: Not on file   • Years of education: Not on file   • Highest education level: Not on file   Occupational History   • Occupation: retired   Social Needs   • Financial resource strain: Not hard at all   • Food insecurity     Worry: Never true     Inability: Never true   • Transportation needs     Medical: Yes     Non-medical: No   Tobacco Use   • Smoking status: Former Smoker     Packs/day: 1.00     Years: 12.00     Pack years: 12.00     Types: Cigarettes     Quit date: 2018     Years since quittin.0   • Smokeless tobacco: Current User   • Tobacco comment: Vape   Substance and Sexual Activity   • Alcohol use: Not Currently     Alcohol/week: 1.0 standard drinks     Types: 1 Standard drinks or equivalent per week     Frequency: Monthly or less     Drinks per session: 1 or 2     Binge frequency: Never     Comment: very rare   • Drug use: Never   • Sexual activity: Not Currently   Lifestyle   • Physical activity     Days per week: Not on file     Minutes per session: Not on file   • Stress: Not on file    Relationships   • Social connections     Talks on phone: Not on file     Gets together: Not on file     Attends Latter-day service: Not on file     Active member of club or organization: Not on file     Attends meetings of clubs or organizations: Not on file     Relationship status: Not on file   • Intimate partner violence     Fear of current or ex partner: Not on file     Emotionally abused: Not on file     Physically abused: Not on file     Forced sexual activity: Not on file   Other Topics Concern   • Not on file   Social History Narrative   • Not on file       Family History   Problem Relation Age of Onset   • Diabetes Mother    • Heart disease Mother    • Kidney disease Mother    • Cancer Father    • Heart disease Father    • Diabetes Father    • Psychiatric Sister         schizophrenic   • Heart disease Sister    • Psychiatric Sister        ALLERGIES:   Allergen Reactions   • Adhesive   (Environmental) RASH     Use Paper tape   • Doxycycline Other (See Comments)     \"Makes sickness worse\"         Review of Systems:  Pertinent ROS above in HPI. Other ROS reviewed and negative.       Vital Last Value 24 Hour Range   Temperature 97.5 °F (36.4 °C) Temp  Min: 97.4 °F (36.3 °C)  Max: 98.1 °F (36.7 °C)   Pulse 61 Pulse  Min: 60  Max: 64   Respiratory 20 Resp  Min: 18  Max: 26   Blood Pressure (!) 92/38 BP  Min: 80/50  Max: 98/40   Art BP   No data recorded   Pulse Oximetry (!) 86 % SpO2  Min: 86 %  Max: 97 %     Vital Today Admitted   Weight 86 kg Weight: 91.2 kg   Height N/A Height: 5' 3\" (160 cm)   BMI N/A BMI (Calculated): 35.62     Weight over the past 48 Hours:  Patient Vitals for the past 48 hrs:   Weight   09/23/20 0700 86 kg        Hemodynamics:      Last Value 24 Hour Range   CVP   No data recorded   PAS/PAD   No data recorded   PCWP   No data recorded   CO   No data recorded   CI   No data recorded   SVR   No data recorded   SV02   No data recorded     Intake/Output:    Last Stool Occurrence: 1 (09/22/20  5197)    No intake/output data recorded.    I/O last 3 completed shifts:  In: 360 [P.O.:360]  Out: 3604 [Other:3604]      Intake/Output Summary (Last 24 hours) at 9/24/2020 1131  Last data filed at 9/24/2020 0600  Gross per 24 hour   Intake 360 ml   Output 3604 ml   Net -3244 ml       Medications/Infusions:  Medications Prior to Admission   Medication Sig Dispense Refill   • PERITONEAL DIALYSIS SOLUTIONS IP Inject into the peritoneum nightly. (1) 4.25% bag and (1) 2.5% bag  Run over 6.5 hours     • sevelamer carbonate (RENVELA) 800 MG tablet Take 1,600 mg by mouth 3 times daily (with meals). Dose: 2 tablets (=1,600 mg)     • Insulin Glargine (LANTUS SOLOSTAR SC) Inject 25 Units into the skin nightly.     • HYDROcodone-acetaminophen (NORCO) 5-325 MG per tablet Take 1 tablet by mouth every 6 hours as needed for Pain. 60 tablet 0   • LORazepam (ATIVAN) 0.5 MG tablet Take 1 tablet by mouth every 8 hours as needed for Anxiety. 90 tablet 1   • Lidocaine 3 % Cream Apply 1 application topically 2 times daily as needed (2 times BID PRN). 1 Tube 3   • furosemide (LASIX) 40 MG tablet Take 60 mg by mouth 2 times daily. Dose: 1½ tablets (=60 mg) 90 tablet 0   • gabapentin (NEURONTIN) 300 MG capsule Take 1 capsule by mouth 2 times daily. 60 capsule 3   • clopidogrel (PLAVIX) 75 MG tablet Take 1 tablet by mouth daily. Do not start before August 19, 2020. 90 tablet 2   • carvedilol (COREG) 3.125 MG tablet Take 1 tablet by mouth every 12 hours. 60 tablet 3   • diclofenac (VOLTAREN) 1 % gel Apply 2 g topically 4 times daily as needed for Pain. Apply to the right knee. 300 g 0   • ziprasidone (GEODON) 60 MG capsule Take 60 mg by mouth 2 times daily. Take in the morning and afternoon     • ziprasidone (GEODON) 80 MG capsule Take 1 capsule by mouth nightly. Indications: Manic-Depression 90 capsule 0   • zolpidem (AMBIEN) 5 MG tablet Take 1 tablet by mouth nightly as needed for Sleep. 30 tablet 0   • allopurinol (ZYLOPRIM) 100 MG tablet  TAKE 1 TABLET EVERY DAY 90 tablet 3   • gemfibrozil (LOPID) 600 MG tablet TAKE 1/2 TABLET EVERY 12 HOURS 90 tablet 1   • busPIRone (BUSPAR) 15 MG tablet Take 0.5 tablets by mouth 2 times daily. 180 tablet 0   • tiZANidine (ZANAFLEX) 2 MG tablet TAKE 1 TABLET BY MOUTH EVERY 8 HOURS AS NEEDED FOR MUSCLE SPASMS. 30 tablet 0   • pantoprazole (PROTONIX) 40 MG tablet Take 1 tablet by mouth 2 times daily. 180 tablet 1   • sertraline (ZOLOFT) 100 MG tablet Take 2.5 tablets by mouth daily. 225 tablet 1   • insulin NPH (NOVOLIN N) 100 UNIT/ML injectable suspension Indications: Type 2 Diabetes Inject 6 units with peritoneal dialysis; if skipping peritoneal dialysis hold NPH dose of insulin 10 mL 12   • insulin aspart (NOVOLOG FLEXPEN) 100 UNIT/ML pen-injector Indications: Type 2 Diabetes Inject 9 untis w/bkfst,lunch,dinner. Correction:<90 -2u,151-200+2u,201-250+4u,251-300+6u,>300+8u Max 51u 30 mL 1   • atorvastatin (LIPITOR) 80 MG tablet Take 1 tablet by mouth daily. 90 tablet 3   • gentamicin (GARAMYCIN) 0.1 % cream Apply 1 application topically daily. Left side of stomach // Peritoneal dialysis catheter     • Omega-3 Fatty Acids (FISH OIL ADULT GUMMIES PO) Take 1-2 each by mouth 2 times daily.      • B complex-vitamin C-folic acid (NEPHRO-YUNIEL) 0.8 MG Tab Take 0.8 mg by mouth daily.     • [DISCONTINUED] levothyroxine 200 MCG tablet Take 1 tablet by mouth once daily 90 tablet 0     • Peritoneal dialysis without additives automated (cycler)   Intraperitoneal Q24H   • Peritoneal dialysis with additives automated (cycler)   Intraperitoneal Q24H   • Peritoneal dialysis with additives automated (cycler)   Intraperitoneal Q24H   • Peritoneal dialysis with additives automated (cycler)   Intraperitoneal Q24H   • levothyroxine  200 mcg Oral QAM AC   • docusate sodium-sennosides  1 tablet Oral BID   • epoetin angie-epbx  10,000 Units Subcutaneous Once per day on Tue Thu Sat   • atorvastatin  80 mg Oral Daily   • busPIRone  7.5 mg Oral  BID   • carvedilol  3.125 mg Oral 2 times per day   • insulin glargine  20 Units Subcutaneous Nightly   • insulin regular (human)   Subcutaneous TID AC   • sertraline  250 mg Oral Daily   • ziprasidone  60 mg Oral BID   • ziprasidone  80 mg Oral Nightly   • sevelamer carbonate  1,600 mg Oral TID WC   • furosemide  60 mg Oral BID     • dextrose 5 % infusion         Physical Exam:    General: Alert, no acute distress   HEENT: Head atraumatic, normocephalic.  Moist oral mucus membranes.  Sclera non-icteric.   Neck: Supple, no JVD.  Trachea midline.  Chest/Lungs: Normal effort.  Symmetrical expansion.  Clear to auscultation.  No wheezes, rales or rhonchi.  CVS: Regular rate and rhythm. S1,S2 well heard. There is no S3 or S4 gallop. Has no pericardial rub heard.  Abdomen: BS well heard. Soft, non tender, non distended.  No hepatosplenomegaly. +PD catheter   Neuro: No focal neurological deficit.  A&O x 3.   Skin: Warm, dry.  No rashes.   Extremities: Pulses intact and symmetric. No clubbing or cyanosis. no edema, tender to palpation     Laboratory Results:  Recent Labs   Lab 09/24/20  0352 09/23/20  0320 09/22/20  0354 09/21/20  0327   SODIUM 133* 132* 137 132*   POTASSIUM 4.1 4.3 5.0 4.5   CHLORIDE 92* 92* 94* 93*   CO2 24 21 21 19*   ANIONGAP 21* 23* 27* 25*   * 119* 136* 124*   CREATININE 9.12* 9.49* 10.60* 10.20*   GLUCOSE 210* 211* 192* 179*   CALCIUM 9.6 9.2 8.5 9.0   PHOS  --   --   --  9.2*       Recent Labs   Lab 09/24/20  0352 09/22/20  2347 09/21/20  0923   WBC 19.4* 17.5* 18.9*   HGB 7.6* 7.1* 7.3*   HCT 25.0* 23.0* 22.9*    210 237       No results found    No results found    Urine Panel  Recent Labs   Lab 09/18/20 2126   USPG 1.017   UPH 5.0   UKET Negative   UPROT 100 *   UGLU Negative   UBILI Negative   UROB 0.2   UWBC Large*   UNITR Negative   URBC Large*       Imaging/Procedures:    Impression, Plan & Recommendations:    ESRD on HD: on PD 7x/week ,follows with Dr. ARTUR Reese   · EDW: 86kg     · Access: PD catheter. Has AVF however weak bruit and unlikely to work for hemodialysis.   · Given elevated BUN, volume overload suspect both non-compliance with PD and inadequate clearance with her current Rx.   · Updating PD Rx as below.   · Adamant about not going back to HD  · BUN rhad remained elevated, now starting to trend down with PD Rx adjustments. Have tried increasing number of exchanges without improved clearance. Patient also making less urine then she used to and likely needs Rx adjusted again and last fill with icodextrin added.   · Possible slow r/o GI bleed- Hb slowly dropping and could be cause of increased BUN production. Stool occult delayed by constipation - resolved and stool occult+, Hgb stable in low 7's. Consider GI consult.     Hypervolemia, LE edema - improved   · Fluid restriction   · Strict I/O. Daily weights   · Increased fluid removal with PD   · Has had w/u for left leg pain/swelling, CT c/w tissue swelling. US Doppler neg for DVT     Metabolic acidosis, mild    · Likely in part due to inadequate dialysis - improved since admit, monitoring for need for sodium bicarb     Hypervolemic hyponatremia   · Should improve with fluid removal   · TSH also elevated at 8.7, primary team to adjust levothyroxine. On SSRI. Blood glucose ~200.       Leukocytosis   · On broad spectrum abx   · PD fluid sent for cell count and culture - cx NGTD,   · Urine cx NGTD     Anemia of chronic disease   · Mildly iron deficient - IV Fe x3 doses - only 2 given IV infiltrated  · EPO 3x/week     Hypothyroid   · Endocrine following     Depression   · On buspar, zoloft   · Conflicting reports on her [facility], states doing dialysis is causing her depression and that we are \"torturing her\" with it. Presented that she has the right to withdraw care and we can stop dialysis which would ultimately lead to death, and she does not want to stop PD at this time     Hypotension   · At goal weight however have been removing large  amounts of fluid with PD. Give 500cc bolus x1 today and will try to decrease UF with PD       PD orders:  Modality: [facility]   Total treatment time : 10 hours   Total Volume: 12.5 L  Dwell time for each fill : 2 hours   Dwell volume each fill: 2.5 L    Number of exchanges: 5   Dianeal solution: alternate 1.5% and 2.5%   Last fill/mid-day exchange: 2.5L with 2.5%   Antibiotics: none      D/w Dr. Osiris Hughes PA-C      Patient was seen and examined today with Michael Hughes PA-C . I have reviewed the data independently and I agree with assessment and plan as stated.    Patient's chemical numbers are improved with improvement in BUN etc after changing her PD prescription however she continued to feel sick and voiced that we torturing her .  As reviewed above, we explained to the patient that she has the option to withdraw from dialysis at any given time and that will ultimately lead to death.  She wanted to continue on dialysis for now.      adjusted PD to decrease UF.     We will follow.     Thais Newell   Transplant Nephrologist, [facility]        Pt lives with his wife and daughter (whom can provide assist upon D/C home) in a private home, no entry step, 1 flight of stairs inside c B/L rails(reachable). Pt has a walk-in shower stall with a retractable shower head, standard toilet seat height, & no grab bar. Pt states he is currently independent with all functional mobility including community ambulation without device. Pt has own straight cane. Pt states he is independent with ADL's as well. Pt reports daily 0/10 pain at rest & states it is worse with any activity 6/10. Pt is left hand dominant, wears eye glasses, drives, & is retired.  Pt denies taking narcotics for pain management. Goal of therapy: manage pain & improve functional mobility.

## 2023-06-28 NOTE — H&P PST ADULT - NEGATIVE MUSCULOSKELETAL SYMPTOMS
no arthralgia/no joint swelling/no myalgia/no muscle cramps/no muscle weakness/no neck pain/no arm pain L/no arm pain R/no back pain/no leg pain L/no leg pain R

## 2023-06-28 NOTE — PHYSICAL THERAPY INITIAL EVALUATION ADULT - RANGE OF MOTION EXAMINATION, REHAB EVAL
except left hip limited due to pain./bilateral upper extremity ROM was WFL (within functional limits)/bilateral lower extremity ROM was WFL (within functional limits)/deficits as listed below

## 2023-06-28 NOTE — H&P PST ADULT - PROBLEM SELECTOR PLAN 1
Pre-op instructions given. Pt verbalized understanding  Chlorhexidine wash instructions given  Pt instructed to hold all NSAIDs + herbal supplements/vitamins 7 days before surgery  T/S ordered STAT before surgery  Pending: lab result  Pending: M/C for abnormal ecg

## 2023-06-28 NOTE — H&P PST ADULT - HISTORY OF PRESENT ILLNESS
78yo male with medical h/o HTN, BPH and OA left hip. c/o left hip pain and presents today for PST for scheduled Left Anterior Total Hip Replacement on 7/18/2023

## 2023-06-28 NOTE — H&P PST ADULT - NSANTHOSAYNRD_GEN_A_CORE
neck 18inches/No. [place] screening performed.  STOP BANG Legend: 0-2 = LOW Risk; 3-4 = INTERMEDIATE Risk; 5-8 = HIGH Risk

## 2023-06-29 LAB — VIT D25+D1,25 OH+D1,25 PNL SERPL-MCNC: 44.8 PG/ML — SIGNIFICANT CHANGE UP (ref 19.9–79.3)

## 2023-06-30 RX ORDER — MUPIROCIN 20 MG/G
1 OINTMENT TOPICAL
Qty: 1 | Refills: 0
Start: 2023-06-30 | End: 2023-07-04

## 2023-06-30 RX ORDER — MUPIROCIN 20 MG/G
1 OINTMENT TOPICAL
Qty: 1 | Refills: 0
Start: 2023-06-30 | End: 2023-07-03

## 2023-07-03 ENCOUNTER — APPOINTMENT (OUTPATIENT)
Dept: INTERNAL MEDICINE | Facility: CLINIC | Age: 78
End: 2023-07-03
Payer: MEDICARE

## 2023-07-03 VITALS
HEIGHT: 68 IN | DIASTOLIC BLOOD PRESSURE: 68 MMHG | SYSTOLIC BLOOD PRESSURE: 126 MMHG | WEIGHT: 187 LBS | BODY MASS INDEX: 28.34 KG/M2

## 2023-07-03 DIAGNOSIS — Z01.818 ENCOUNTER FOR OTHER PREPROCEDURAL EXAMINATION: ICD-10-CM

## 2023-07-03 PROCEDURE — 99213 OFFICE O/P EST LOW 20 MIN: CPT

## 2023-07-03 NOTE — HISTORY OF PRESENT ILLNESS
[de-identified] : 77-year-old male presents for medical evaluation prior to left hip replacement on 7/18 with Dr. Mensah at [facility].\par He has a history of hypertension, mild hyperlipidemia, early diabetes and BPH/PIN.\par Has been generally well without any recent illness. left elbow and forearm pain

## 2023-07-03 NOTE — REVIEW OF SYSTEMS
[Fever] : no fever [Chest Pain] : no chest pain [Palpitations] : no palpitations [Lower Ext Edema] : no lower extremity edema [Shortness Of Breath] : no shortness of breath [Dyspnea on Exertion] : not dyspnea on exertion [Dysuria] : no dysuria [Joint Pain] : joint pain [Joint Stiffness] : joint stiffness [Back Pain] : back pain [Headache] : no headache [Dizziness] : no dizziness

## 2023-07-17 RX ORDER — CELECOXIB 200 MG/1
200 CAPSULE ORAL ONCE
Refills: 0 | Status: DISCONTINUED | OUTPATIENT
Start: 2023-07-18 | End: 2023-07-18

## 2023-07-17 RX ORDER — ACETAMINOPHEN 500 MG
650 TABLET ORAL ONCE
Refills: 0 | Status: DISCONTINUED | OUTPATIENT
Start: 2023-07-18 | End: 2023-07-18

## 2023-07-18 ENCOUNTER — INPATIENT (INPATIENT)
Facility: HOSPITAL | Age: 78
LOS: 0 days | Discharge: HOME | End: 2023-07-18
Attending: STUDENT IN AN ORGANIZED HEALTH CARE EDUCATION/TRAINING PROGRAM | Admitting: STUDENT IN AN ORGANIZED HEALTH CARE EDUCATION/TRAINING PROGRAM

## 2023-07-18 VITALS
SYSTOLIC BLOOD PRESSURE: 135 MMHG | OXYGEN SATURATION: 96 % | DIASTOLIC BLOOD PRESSURE: 75 MMHG | WEIGHT: 184.97 LBS | RESPIRATION RATE: 18 BRPM | TEMPERATURE: 97 F | HEIGHT: 68 IN | HEART RATE: 116 BPM

## 2023-07-18 DIAGNOSIS — Z98.890 OTHER SPECIFIED POSTPROCEDURAL STATES: Chronic | ICD-10-CM

## 2023-07-18 LAB — BLD GP AB SCN SERPL QL: SIGNIFICANT CHANGE UP

## 2023-07-18 NOTE — PATIENT PROFILE ADULT - HISTORY OF COVID-19 VACCINATION
Spoke with Lupillo, verified current dose 1.5mg Q 12 hours, and instructed Lupillo to decrease dose to 1.0mg Q 12 hours.   Lupillo verified dose change, is doing labs every 2-3 months.   Yes

## 2023-07-19 PROBLEM — I10 ESSENTIAL (PRIMARY) HYPERTENSION: Chronic | Status: ACTIVE | Noted: 2023-06-28

## 2023-07-26 ENCOUNTER — APPOINTMENT (OUTPATIENT)
Dept: INTERNAL MEDICINE | Facility: CLINIC | Age: 78
End: 2023-07-26
Payer: MEDICARE

## 2023-07-26 VITALS
BODY MASS INDEX: 27.74 KG/M2 | WEIGHT: 183 LBS | HEIGHT: 68 IN | SYSTOLIC BLOOD PRESSURE: 100 MMHG | DIASTOLIC BLOOD PRESSURE: 62 MMHG

## 2023-07-26 DIAGNOSIS — B36.9 SUPERFICIAL MYCOSIS, UNSPECIFIED: ICD-10-CM

## 2023-07-26 PROCEDURE — 99214 OFFICE O/P EST MOD 30 MIN: CPT | Mod: 25

## 2023-07-26 PROCEDURE — 36415 COLL VENOUS BLD VENIPUNCTURE: CPT

## 2023-07-26 RX ORDER — CLOTRIMAZOLE AND BETAMETHASONE DIPROPIONATE 10; .5 MG/G; MG/G
1-0.05 CREAM TOPICAL
Qty: 1 | Refills: 0 | Status: ACTIVE | COMMUNITY
Start: 2021-02-22 | End: 1900-01-01

## 2023-07-26 NOTE — HISTORY OF PRESENT ILLNESS
[de-identified] : Presents for follow-up of labs and prescription renewals.\par Has a history of hypertension and diabetes.\par A1c done in 3/23 was 7.5 and a repeat done prior to surgery on 6/23 was 7.6.\par He states his surgery was canceled because of the elevated A1c.  He is not on medication at present.  Does state that he been drinking excessive amount of orange juice which he just also recently stopped.

## 2023-07-26 NOTE — PHYSICAL EXAM
[No Respiratory Distress] : no respiratory distress  [Normal Rate] : normal rate  [No Edema] : there was no peripheral edema

## 2023-07-26 NOTE — ASSESSMENT
[FreeTextEntry1] : Diabetes–follow-up A1c was sent today.  He was told that there may not be significant change in such a short period of time.  If it is still elevated we discussed starting metformin 5 mg twice daily and repeating the A1c prior to his next scheduled surgical date the end of August.\par \par Hypertension–his blood pressure does remain well controlled for now he will continue Lotrel 5/40.\par \par Fungal dermatitis–intermittent problem.  Has used Lotrisone in the past with good results and this was renewed today as well

## 2023-07-27 ENCOUNTER — APPOINTMENT (OUTPATIENT)
Dept: ORTHOPEDIC SURGERY | Facility: CLINIC | Age: 78
End: 2023-07-27
Payer: MEDICARE

## 2023-07-27 LAB
ANION GAP SERPL CALC-SCNC: 17 MMOL/L
BUN SERPL-MCNC: 44 MG/DL
CALCIUM SERPL-MCNC: 10.2 MG/DL
CHLORIDE SERPL-SCNC: 104 MMOL/L
CO2 SERPL-SCNC: 17 MMOL/L
CREAT SERPL-MCNC: 1.51 MG/DL
EGFR: 47 ML/MIN/1.73M2
ESTIMATED AVERAGE GLUCOSE: 157 MG/DL
GLUCOSE SERPL-MCNC: 123 MG/DL
HBA1C MFR BLD HPLC: 7.1 %
POTASSIUM SERPL-SCNC: 5.2 MMOL/L
SODIUM SERPL-SCNC: 138 MMOL/L

## 2023-07-27 PROCEDURE — 99213 OFFICE O/P EST LOW 20 MIN: CPT

## 2023-07-27 NOTE — ASSESSMENT
[FreeTextEntry1] : 77M p/w adv L hip OA\par \par Planned for L LUCILA 8/25, seen PCP who is planning for possible start of metformin, previous sx cancelled due to  elevated BG of 280 in preop \par

## 2023-07-27 NOTE — HISTORY OF PRESENT ILLNESS
[6] : 6 [5] : 5 [Retired] : Work status: retired [de-identified] : 77M p/w L hip pain. Pain for a few years, complains of anterior hip and thigh pain, limited ROM, trouble with shoes and socks. Has not had any recent PT or NSAIDs, not sure if he would like any surgical intervention at this point.\par \par 4/27/23: Here for f/up L hip. Was unable to tolerate Mobic as it had irritated his stomach. PT had worsened his pain. Ibuprofen provides mild relief. \par 7/27/23 f/u L hip, sx cancelled due to elevated BG in preop, planned for 8/25 [] : no [de-identified] : Patient had to schedule surgery for later date due to blood sugars.

## 2023-07-28 ENCOUNTER — APPOINTMENT (OUTPATIENT)
Dept: INTERNAL MEDICINE | Facility: CLINIC | Age: 78
End: 2023-07-28
Payer: MEDICARE

## 2023-07-28 PROBLEM — M16.12 UNILATERAL PRIMARY OSTEOARTHRITIS, LEFT HIP: Chronic | Status: ACTIVE | Noted: 2023-06-28

## 2023-07-28 PROBLEM — N40.0 BENIGN PROSTATIC HYPERPLASIA WITHOUT LOWER URINARY TRACT SYMPTOMS: Chronic | Status: ACTIVE | Noted: 2023-06-28

## 2023-07-28 PROCEDURE — 36415 COLL VENOUS BLD VENIPUNCTURE: CPT

## 2023-07-29 DIAGNOSIS — E78.5 HYPERLIPIDEMIA, UNSPECIFIED: ICD-10-CM

## 2023-07-29 DIAGNOSIS — E11.9 TYPE 2 DIABETES MELLITUS WITHOUT COMPLICATIONS: ICD-10-CM

## 2023-07-29 DIAGNOSIS — Z87.442 PERSONAL HISTORY OF URINARY CALCULI: ICD-10-CM

## 2023-07-29 DIAGNOSIS — M16.12 UNILATERAL PRIMARY OSTEOARTHRITIS, LEFT HIP: ICD-10-CM

## 2023-07-29 DIAGNOSIS — N40.0 BENIGN PROSTATIC HYPERPLASIA WITHOUT LOWER URINARY TRACT SYMPTOMS: ICD-10-CM

## 2023-07-29 DIAGNOSIS — K21.9 GASTRO-ESOPHAGEAL REFLUX DISEASE WITHOUT ESOPHAGITIS: ICD-10-CM

## 2023-07-29 DIAGNOSIS — M48.061 SPINAL STENOSIS, LUMBAR REGION WITHOUT NEUROGENIC CLAUDICATION: ICD-10-CM

## 2023-07-29 DIAGNOSIS — Z86.16 PERSONAL HISTORY OF COVID-19: ICD-10-CM

## 2023-07-30 LAB
ANION GAP SERPL CALC-SCNC: 15 MMOL/L
BUN SERPL-MCNC: 29 MG/DL
CALCIUM SERPL-MCNC: 10 MG/DL
CHLORIDE SERPL-SCNC: 102 MMOL/L
CO2 SERPL-SCNC: 19 MMOL/L
CREAT SERPL-MCNC: 1.16 MG/DL
EGFR: 64 ML/MIN/1.73M2
GLUCOSE SERPL-MCNC: 163 MG/DL
POTASSIUM SERPL-SCNC: 4.9 MMOL/L
SODIUM SERPL-SCNC: 137 MMOL/L

## 2023-08-07 ENCOUNTER — RESULT REVIEW (OUTPATIENT)
Age: 78
End: 2023-08-07

## 2023-08-07 ENCOUNTER — OUTPATIENT (OUTPATIENT)
Dept: OUTPATIENT SERVICES | Facility: HOSPITAL | Age: 78
LOS: 1 days | Discharge: HOME | End: 2023-08-07
Payer: MEDICARE

## 2023-08-07 VITALS
DIASTOLIC BLOOD PRESSURE: 74 MMHG | TEMPERATURE: 98 F | WEIGHT: 185.63 LBS | HEIGHT: 68 IN | OXYGEN SATURATION: 98 % | HEART RATE: 95 BPM | SYSTOLIC BLOOD PRESSURE: 145 MMHG | RESPIRATION RATE: 17 BRPM

## 2023-08-07 DIAGNOSIS — I10 ESSENTIAL (PRIMARY) HYPERTENSION: ICD-10-CM

## 2023-08-07 DIAGNOSIS — M16.12 UNILATERAL PRIMARY OSTEOARTHRITIS, LEFT HIP: ICD-10-CM

## 2023-08-07 DIAGNOSIS — Z01.818 ENCOUNTER FOR OTHER PREPROCEDURAL EXAMINATION: ICD-10-CM

## 2023-08-07 DIAGNOSIS — Z98.890 OTHER SPECIFIED POSTPROCEDURAL STATES: Chronic | ICD-10-CM

## 2023-08-07 DIAGNOSIS — R73.09 OTHER ABNORMAL GLUCOSE: ICD-10-CM

## 2023-08-07 LAB
A1C WITH ESTIMATED AVERAGE GLUCOSE RESULT: 7.4 % — HIGH (ref 4–5.6)
ALBUMIN SERPL ELPH-MCNC: 4.1 G/DL — SIGNIFICANT CHANGE UP (ref 3.3–5)
ALP SERPL-CCNC: 111 U/L — SIGNIFICANT CHANGE UP (ref 40–120)
ALT FLD-CCNC: 75 U/L — SIGNIFICANT CHANGE UP (ref 12–78)
ANION GAP SERPL CALC-SCNC: 7 MMOL/L — SIGNIFICANT CHANGE UP (ref 5–17)
APTT BLD: 35.2 SEC — SIGNIFICANT CHANGE UP (ref 24.5–35.6)
AST SERPL-CCNC: 48 U/L — HIGH (ref 15–37)
BASOPHILS # BLD AUTO: 0.08 K/UL — SIGNIFICANT CHANGE UP (ref 0–0.2)
BASOPHILS NFR BLD AUTO: 1 % — SIGNIFICANT CHANGE UP (ref 0–2)
BILIRUB SERPL-MCNC: 0.5 MG/DL — SIGNIFICANT CHANGE UP (ref 0.2–1.2)
BUN SERPL-MCNC: 25 MG/DL — HIGH (ref 7–23)
CALCIUM SERPL-MCNC: 9.4 MG/DL — SIGNIFICANT CHANGE UP (ref 8.5–10.1)
CHLORIDE SERPL-SCNC: 107 MMOL/L — SIGNIFICANT CHANGE UP (ref 96–108)
CO2 SERPL-SCNC: 24 MMOL/L — SIGNIFICANT CHANGE UP (ref 22–31)
CREAT SERPL-MCNC: 1.12 MG/DL — SIGNIFICANT CHANGE UP (ref 0.5–1.3)
EGFR: 67 ML/MIN/1.73M2 — SIGNIFICANT CHANGE UP
EOSINOPHIL # BLD AUTO: 0.12 K/UL — SIGNIFICANT CHANGE UP (ref 0–0.5)
EOSINOPHIL NFR BLD AUTO: 1.6 % — SIGNIFICANT CHANGE UP (ref 0–6)
ESTIMATED AVERAGE GLUCOSE: 166 MG/DL — HIGH (ref 68–114)
GLUCOSE SERPL-MCNC: 154 MG/DL — HIGH (ref 70–99)
HCT VFR BLD CALC: 41.9 % — SIGNIFICANT CHANGE UP (ref 39–50)
HGB BLD-MCNC: 14.3 G/DL — SIGNIFICANT CHANGE UP (ref 13–17)
IMM GRANULOCYTES NFR BLD AUTO: 0.4 % — SIGNIFICANT CHANGE UP (ref 0–0.9)
INR BLD: 1 RATIO — SIGNIFICANT CHANGE UP (ref 0.85–1.18)
LYMPHOCYTES # BLD AUTO: 2.07 K/UL — SIGNIFICANT CHANGE UP (ref 1–3.3)
LYMPHOCYTES # BLD AUTO: 26.8 % — SIGNIFICANT CHANGE UP (ref 13–44)
MCHC RBC-ENTMCNC: 31.4 PG — SIGNIFICANT CHANGE UP (ref 27–34)
MCHC RBC-ENTMCNC: 34.1 G/DL — SIGNIFICANT CHANGE UP (ref 32–36)
MCV RBC AUTO: 92.1 FL — SIGNIFICANT CHANGE UP (ref 80–100)
MONOCYTES # BLD AUTO: 0.87 K/UL — SIGNIFICANT CHANGE UP (ref 0–0.9)
MONOCYTES NFR BLD AUTO: 11.3 % — SIGNIFICANT CHANGE UP (ref 2–14)
NEUTROPHILS # BLD AUTO: 4.54 K/UL — SIGNIFICANT CHANGE UP (ref 1.8–7.4)
NEUTROPHILS NFR BLD AUTO: 58.9 % — SIGNIFICANT CHANGE UP (ref 43–77)
NRBC # BLD: 0 /100 WBCS — SIGNIFICANT CHANGE UP (ref 0–0)
PLATELET # BLD AUTO: 255 K/UL — SIGNIFICANT CHANGE UP (ref 150–400)
POTASSIUM SERPL-MCNC: 3.9 MMOL/L — SIGNIFICANT CHANGE UP (ref 3.5–5.3)
POTASSIUM SERPL-SCNC: 3.9 MMOL/L — SIGNIFICANT CHANGE UP (ref 3.5–5.3)
PROT SERPL-MCNC: 8.3 GM/DL — SIGNIFICANT CHANGE UP (ref 6–8.3)
PROTHROM AB SERPL-ACNC: 12 SEC — SIGNIFICANT CHANGE UP (ref 9.5–13)
RBC # BLD: 4.55 M/UL — SIGNIFICANT CHANGE UP (ref 4.2–5.8)
RBC # FLD: 12.9 % — SIGNIFICANT CHANGE UP (ref 10.3–14.5)
SODIUM SERPL-SCNC: 138 MMOL/L — SIGNIFICANT CHANGE UP (ref 135–145)
WBC # BLD: 7.71 K/UL — SIGNIFICANT CHANGE UP (ref 3.8–10.5)
WBC # FLD AUTO: 7.71 K/UL — SIGNIFICANT CHANGE UP (ref 3.8–10.5)

## 2023-08-07 PROCEDURE — 71046 X-RAY EXAM CHEST 2 VIEWS: CPT | Mod: 26

## 2023-08-07 PROCEDURE — 93010 ELECTROCARDIOGRAM REPORT: CPT

## 2023-08-07 NOTE — H&P PST ADULT - ASSESSMENT
Mr Villegas is a 76 y/o male with hx of HTN, BPH, presents with left hip pain 2/2 to OA left hip for presurgical testing for left anterior total hip replacement on 2023 with Dr. Mensah.  CAPRINI SCORE    AGE RELATED RISK FACTORS                                                       MOBILITY RELATED FACTORS  [ ] Age 41-60 years                                            (1 Point)                  [ ] Bed rest                                                        (1 Point)  [ ] Age: 61-74 years                                           (2 Points)                [ ] Plaster cast                                                   (2 Points)  [X ] Age= 75 years                                              (3 Points)                 [ ] Bed bound for more than 72 hours                   (2 Points)    DISEASE RELATED RISK FACTORS                                               GENDER SPECIFIC FACTORS  [X ] Edema in the lower extremities                       (1 Point)                  [ ] Pregnancy                                                     (1 Point)  [ ] Varicose veins                                               (1 Point)                  [ ] Post-partum < 6 weeks                                   (1 Point)             [X ] BMI > 25 Kg/m2                                            (1 Point)                  [ ] Hormonal therapy  or oral contraception            (1 Point)                 [ ] Sepsis (in the previous month)                        (1 Point)                  [ ] History of pregnancy complications  [ ] Pneumonia or serious lung disease                                               [ ] Unexplained or recurrent                       (1 Point)           (in the previous month)                               (1 Point)  [ ] Abnormal pulmonary function test                     (1 Point)                 SURGERY RELATED RISK FACTORS  [ ] Acute myocardial infarction                              (1 Point)                 [ ]  Section                                            (1 Point)  [ ] Congestive heart failure (in the previous month)  (1 Point)                 [ ] Minor surgery                                                 (1 Point)   [ ] Inflammatory bowel disease                             (1 Point)                 [ ] Arthroscopic surgery                                        (2 Points)  [ ] Central venous access                                    (2 Points)                [ ] General surgery lasting more than 45 minutes   (2 Points)       [ ] Stroke (in the previous month)                          (5 Points)               [X ] Elective arthroplasty                                        (5 Points)                                                                                                                                               HEMATOLOGY RELATED FACTORS                                                 TRAUMA RELATED RISK FACTORS  [ ] Prior episodes of VTE                                     (3 Points)                 [ ] Fracture of the hip, pelvis, or leg                       (5 Points)  [ ] Positive family history for VTE                         (3 Points)                 [ ] Acute spinal cord injury (in the previous month)  (5 Points)  [ ] Prothrombin 11185 A                                      (3 Points)                 [ ] Paralysis  (less than 1 month)                          (5 Points)  [ ] Factor V Leiden                                             (3 Points)                 [ ] Multiple Trauma within 1 month                         (5 Points)  [ ] Lupus anticoagulants                                     (3 Points)                                                           [ ] Anticardiolipin antibodies                                (3 Points)                                                       [ ] High homocysteine in the blood                      (3 Points)                                             [ ] Other congenital or acquired thrombophilia       (3 Points)                                                [ ] Heparin induced thrombocytopenia                  (3 Points)                                          Total Score [      10    ]  Caprini Score 0-2: Low risk, No VTE Prophylaxis required for most patient's, encourage ambulation  Caprini Score 3-6: At Risk, Pharmacologic VTE prophylaxis is indicated for most patients ( in the absence of a contraindication)  Caprini Score Greater than or = 7: High Risk , pharmacologic VTE is indicated for most patients ( in the absence of a contraindication)    Caprini score indicates that the patient is high risk for VTE event ( score 6 or greater). Surgical patient's in this group will benefit from both pharmacologic prophylaxis and intermittent compression devices . Surgical team will determine the balance between VTE  risk and bleeding risk and other clinical considerations

## 2023-08-07 NOTE — H&P PST ADULT - HISTORY OF PRESENT ILLNESS
Mr Bakari is a 76 y/o male with hx of HTN, BPH, presents with left hip pain 2/2 to OA left hip for presurgical testing for left anterior total hip replacement on 8/25/2023 with Dr. Mensah. States that pt easily bleeds but with out personal or family history of bleeding disorder.  Goal: walk without pain  denies recent travels in the past 30 days. No fever, SOB, cough, flu like symptoms or body rash- covid screen

## 2023-08-08 LAB
MRSA PCR RESULT.: SIGNIFICANT CHANGE UP
S AUREUS DNA NOSE QL NAA+PROBE: SIGNIFICANT CHANGE UP
VIT D25+D1,25 OH+D1,25 PNL SERPL-MCNC: 37.1 PG/ML — SIGNIFICANT CHANGE UP (ref 19.9–79.3)

## 2023-08-10 ENCOUNTER — APPOINTMENT (OUTPATIENT)
Dept: INTERNAL MEDICINE | Facility: CLINIC | Age: 78
End: 2023-08-10
Payer: MEDICARE

## 2023-08-10 VITALS
WEIGHT: 185 LBS | SYSTOLIC BLOOD PRESSURE: 122 MMHG | DIASTOLIC BLOOD PRESSURE: 76 MMHG | BODY MASS INDEX: 29.03 KG/M2 | HEIGHT: 67 IN

## 2023-08-10 DIAGNOSIS — M16.12 UNILATERAL PRIMARY OSTEOARTHRITIS, LEFT HIP: ICD-10-CM

## 2023-08-10 PROCEDURE — 99213 OFFICE O/P EST LOW 20 MIN: CPT

## 2023-08-10 NOTE — REVIEW OF SYSTEMS
[Fever] : no fever [Chest Pain] : no chest pain [Shortness Of Breath] : no shortness of breath [Wheezing] : no wheezing [Dyspnea on Exertion] : not dyspnea on exertion [Abdominal Pain] : no abdominal pain [Joint Pain] : joint pain [Headache] : no headache [Dizziness] : no dizziness

## 2023-08-10 NOTE — HISTORY OF PRESENT ILLNESS
[de-identified] : 77-year-old male presents for medical evaluation prior to left hip replacement on 8/25/2023 with Dr. Mensah at [facility]. Has a history of hypertension, mild hyperlipidemia, early diabetes and BPH/PIN. Previous surgery was canceled because his A1c and glucose level from PST testing was elevated.  It was not a fasting test. Did have a repeat A1c done on 9/7 that was 7.1 with a glucose of 163. Had PST testing done again on 8/7 that revealed an A1c of 7.4 but a nonfasting glucose of 154.

## 2023-08-10 NOTE — ASSESSMENT
[FreeTextEntry1] : Left hip arthritis/replacement–his exam is unremarkable. EKG reveals sinus rhythm with LAHB/RBBB which are unchanged. PST labs revealed an A1c of 7.4 and a glucose of 154.  Of note he was not fasting for the lab work. Chest x-ray revealed no active disease. No medical contraindications.  Hypertension blood pressure remains very well-controlled on Lotrel 5/40 daily.

## 2023-08-10 NOTE — PHYSICAL EXAM
[No Acute Distress] : no acute distress [No JVD] : no jugular venous distention [Clear to Auscultation] : lungs were clear to auscultation bilaterally [Normal Rate] : normal rate  [Regular Rhythm] : with a regular rhythm [No Murmur] : no murmur heard [Non Tender] : non-tender [No Focal Deficits] : no focal deficits [Alert and Oriented x3] : oriented to person, place, and time [de-identified] : Trace ankle swelling

## 2023-08-23 RX ORDER — CELECOXIB 200 MG/1
200 CAPSULE ORAL EVERY 12 HOURS
Refills: 0 | Status: DISCONTINUED | OUTPATIENT
Start: 2023-08-26 | End: 2023-08-26

## 2023-08-23 RX ORDER — ONDANSETRON 8 MG/1
4 TABLET, FILM COATED ORAL EVERY 6 HOURS
Refills: 0 | Status: DISCONTINUED | OUTPATIENT
Start: 2023-08-25 | End: 2023-08-26

## 2023-08-23 RX ORDER — ACETAMINOPHEN 500 MG
650 TABLET ORAL EVERY 6 HOURS
Refills: 0 | Status: DISCONTINUED | OUTPATIENT
Start: 2023-08-25 | End: 2023-08-26

## 2023-08-23 RX ORDER — TAMSULOSIN HYDROCHLORIDE 0.4 MG/1
0.4 CAPSULE ORAL AT BEDTIME
Refills: 0 | Status: DISCONTINUED | OUTPATIENT
Start: 2023-08-25 | End: 2023-08-26

## 2023-08-23 RX ORDER — ASCORBIC ACID 60 MG
500 TABLET,CHEWABLE ORAL
Refills: 0 | Status: DISCONTINUED | OUTPATIENT
Start: 2023-08-25 | End: 2023-08-26

## 2023-08-23 RX ORDER — DEXAMETHASONE 0.5 MG/5ML
10 ELIXIR ORAL ONCE
Refills: 0 | Status: COMPLETED | OUTPATIENT
Start: 2023-08-26 | End: 2023-08-26

## 2023-08-23 RX ORDER — POLYETHYLENE GLYCOL 3350 17 G/17G
17 POWDER, FOR SOLUTION ORAL AT BEDTIME
Refills: 0 | Status: DISCONTINUED | OUTPATIENT
Start: 2023-08-25 | End: 2023-08-26

## 2023-08-23 RX ORDER — OXYCODONE HYDROCHLORIDE 5 MG/1
5 TABLET ORAL EVERY 4 HOURS
Refills: 0 | Status: DISCONTINUED | OUTPATIENT
Start: 2023-08-25 | End: 2023-08-26

## 2023-08-23 RX ORDER — OXYCODONE HYDROCHLORIDE 5 MG/1
10 TABLET ORAL EVERY 4 HOURS
Refills: 0 | Status: DISCONTINUED | OUTPATIENT
Start: 2023-08-25 | End: 2023-08-26

## 2023-08-23 RX ORDER — ASPIRIN/CALCIUM CARB/MAGNESIUM 324 MG
81 TABLET ORAL
Refills: 0 | Status: DISCONTINUED | OUTPATIENT
Start: 2023-08-26 | End: 2023-08-26

## 2023-08-23 RX ORDER — HYDROMORPHONE HYDROCHLORIDE 2 MG/ML
0.5 INJECTION INTRAMUSCULAR; INTRAVENOUS; SUBCUTANEOUS
Refills: 0 | Status: DISCONTINUED | OUTPATIENT
Start: 2023-08-25 | End: 2023-08-26

## 2023-08-23 RX ORDER — SENNA PLUS 8.6 MG/1
2 TABLET ORAL AT BEDTIME
Refills: 0 | Status: DISCONTINUED | OUTPATIENT
Start: 2023-08-25 | End: 2023-08-26

## 2023-08-23 RX ORDER — PANTOPRAZOLE SODIUM 20 MG/1
40 TABLET, DELAYED RELEASE ORAL
Refills: 0 | Status: DISCONTINUED | OUTPATIENT
Start: 2023-08-25 | End: 2023-08-26

## 2023-08-23 RX ORDER — LANOLIN ALCOHOL/MO/W.PET/CERES
3 CREAM (GRAM) TOPICAL AT BEDTIME
Refills: 0 | Status: DISCONTINUED | OUTPATIENT
Start: 2023-08-25 | End: 2023-08-26

## 2023-08-23 RX ORDER — SODIUM CHLORIDE 9 MG/ML
1000 INJECTION, SOLUTION INTRAVENOUS
Refills: 0 | Status: DISCONTINUED | OUTPATIENT
Start: 2023-08-25 | End: 2023-08-26

## 2023-08-23 RX ORDER — ACETAMINOPHEN 500 MG
1000 TABLET ORAL ONCE
Refills: 0 | Status: DISCONTINUED | OUTPATIENT
Start: 2023-08-25 | End: 2023-08-26

## 2023-08-24 ENCOUNTER — TRANSCRIPTION ENCOUNTER (OUTPATIENT)
Age: 78
End: 2023-08-24

## 2023-08-25 ENCOUNTER — INPATIENT (INPATIENT)
Facility: HOSPITAL | Age: 78
LOS: 0 days | Discharge: HOME HEALTH | End: 2023-08-26
Attending: STUDENT IN AN ORGANIZED HEALTH CARE EDUCATION/TRAINING PROGRAM | Admitting: STUDENT IN AN ORGANIZED HEALTH CARE EDUCATION/TRAINING PROGRAM

## 2023-08-25 ENCOUNTER — TRANSCRIPTION ENCOUNTER (OUTPATIENT)
Age: 78
End: 2023-08-25

## 2023-08-25 ENCOUNTER — APPOINTMENT (OUTPATIENT)
Dept: ORTHOPEDIC SURGERY | Facility: HOSPITAL | Age: 78
End: 2023-08-25
Payer: MEDICARE

## 2023-08-25 VITALS
WEIGHT: 179.02 LBS | DIASTOLIC BLOOD PRESSURE: 80 MMHG | HEART RATE: 108 BPM | TEMPERATURE: 98 F | OXYGEN SATURATION: 97 % | HEIGHT: 68 IN | RESPIRATION RATE: 17 BRPM | SYSTOLIC BLOOD PRESSURE: 143 MMHG

## 2023-08-25 DIAGNOSIS — Z98.890 OTHER SPECIFIED POSTPROCEDURAL STATES: Chronic | ICD-10-CM

## 2023-08-25 LAB
ANION GAP SERPL CALC-SCNC: 5 MMOL/L — SIGNIFICANT CHANGE UP (ref 5–17)
BUN SERPL-MCNC: 28 MG/DL — HIGH (ref 7–23)
CALCIUM SERPL-MCNC: 8.8 MG/DL — SIGNIFICANT CHANGE UP (ref 8.5–10.1)
CHLORIDE SERPL-SCNC: 108 MMOL/L — SIGNIFICANT CHANGE UP (ref 96–108)
CO2 SERPL-SCNC: 27 MMOL/L — SIGNIFICANT CHANGE UP (ref 22–31)
CREAT SERPL-MCNC: 1.13 MG/DL — SIGNIFICANT CHANGE UP (ref 0.5–1.3)
EGFR: 66 ML/MIN/1.73M2 — SIGNIFICANT CHANGE UP
GLUCOSE SERPL-MCNC: 137 MG/DL — HIGH (ref 70–99)
HCT VFR BLD CALC: 39 % — SIGNIFICANT CHANGE UP (ref 39–50)
HGB BLD-MCNC: 13.1 G/DL — SIGNIFICANT CHANGE UP (ref 13–17)
MCHC RBC-ENTMCNC: 31.1 PG — SIGNIFICANT CHANGE UP (ref 27–34)
MCHC RBC-ENTMCNC: 33.6 G/DL — SIGNIFICANT CHANGE UP (ref 32–36)
MCV RBC AUTO: 92.6 FL — SIGNIFICANT CHANGE UP (ref 80–100)
NRBC # BLD: 0 /100 WBCS — SIGNIFICANT CHANGE UP (ref 0–0)
PLATELET # BLD AUTO: 208 K/UL — SIGNIFICANT CHANGE UP (ref 150–400)
POTASSIUM SERPL-MCNC: 4.1 MMOL/L — SIGNIFICANT CHANGE UP (ref 3.5–5.3)
POTASSIUM SERPL-SCNC: 4.1 MMOL/L — SIGNIFICANT CHANGE UP (ref 3.5–5.3)
RBC # BLD: 4.21 M/UL — SIGNIFICANT CHANGE UP (ref 4.2–5.8)
RBC # FLD: 12.9 % — SIGNIFICANT CHANGE UP (ref 10.3–14.5)
SODIUM SERPL-SCNC: 140 MMOL/L — SIGNIFICANT CHANGE UP (ref 135–145)
WBC # BLD: 15.71 K/UL — HIGH (ref 3.8–10.5)
WBC # FLD AUTO: 15.71 K/UL — HIGH (ref 3.8–10.5)

## 2023-08-25 PROCEDURE — 27130 TOTAL HIP ARTHROPLASTY: CPT | Mod: LT

## 2023-08-25 PROCEDURE — 27130 TOTAL HIP ARTHROPLASTY: CPT | Mod: AS,LT

## 2023-08-25 DEVICE — SHELL ACET PINNACLE W/ GRIPTION 60MM: Type: IMPLANTABLE DEVICE | Site: LEFT | Status: FUNCTIONAL

## 2023-08-25 DEVICE — HEAD FEM CEM TAPR PLUS 1.5MM 12/14X36MM: Type: IMPLANTABLE DEVICE | Site: LEFT | Status: FUNCTIONAL

## 2023-08-25 DEVICE — PINNACLE ALTRX NEURTRAL LINER 36X60MM: Type: IMPLANTABLE DEVICE | Site: LEFT | Status: FUNCTIONAL

## 2023-08-25 DEVICE — STEM FEM ACTIS HIGH COLLAR SZ 7: Type: IMPLANTABLE DEVICE | Site: LEFT | Status: FUNCTIONAL

## 2023-08-25 RX ORDER — ASCORBIC ACID 60 MG
1 TABLET,CHEWABLE ORAL
Qty: 0 | Refills: 0 | DISCHARGE
Start: 2023-08-25

## 2023-08-25 RX ORDER — PANTOPRAZOLE SODIUM 20 MG/1
1 TABLET, DELAYED RELEASE ORAL
Qty: 30 | Refills: 0
Start: 2023-08-25 | End: 2023-09-23

## 2023-08-25 RX ORDER — NALOXONE HYDROCHLORIDE 4 MG/.1ML
4 SPRAY NASAL
Qty: 1 | Refills: 0
Start: 2023-08-25 | End: 2023-08-25

## 2023-08-25 RX ORDER — CELECOXIB 200 MG/1
1 CAPSULE ORAL
Qty: 60 | Refills: 0
Start: 2023-08-25 | End: 2023-09-23

## 2023-08-25 RX ORDER — ONDANSETRON 8 MG/1
4 TABLET, FILM COATED ORAL ONCE
Refills: 0 | Status: DISCONTINUED | OUTPATIENT
Start: 2023-08-25 | End: 2023-08-25

## 2023-08-25 RX ORDER — ASPIRIN/CALCIUM CARB/MAGNESIUM 324 MG
1 TABLET ORAL
Qty: 60 | Refills: 0
Start: 2023-08-25 | End: 2023-09-23

## 2023-08-25 RX ORDER — OXYCODONE HYDROCHLORIDE 5 MG/1
1 TABLET ORAL
Qty: 42 | Refills: 0
Start: 2023-08-25 | End: 2023-08-31

## 2023-08-25 RX ORDER — SENNA PLUS 8.6 MG/1
2 TABLET ORAL
Qty: 0 | Refills: 0 | DISCHARGE
Start: 2023-08-25

## 2023-08-25 RX ORDER — ACETAMINOPHEN 500 MG
650 TABLET ORAL ONCE
Refills: 0 | Status: COMPLETED | OUTPATIENT
Start: 2023-08-25 | End: 2023-08-25

## 2023-08-25 RX ORDER — ENALAPRIL MALEATE AND HYDROCHLOROTHIAZIDE 10; 25 MG/1; MG/1
1 TABLET ORAL
Refills: 0 | DISCHARGE

## 2023-08-25 RX ORDER — HYDROMORPHONE HYDROCHLORIDE 2 MG/ML
0.5 INJECTION INTRAMUSCULAR; INTRAVENOUS; SUBCUTANEOUS
Refills: 0 | Status: DISCONTINUED | OUTPATIENT
Start: 2023-08-25 | End: 2023-08-25

## 2023-08-25 RX ORDER — SODIUM CHLORIDE 9 MG/ML
3 INJECTION INTRAMUSCULAR; INTRAVENOUS; SUBCUTANEOUS EVERY 8 HOURS
Refills: 0 | Status: DISCONTINUED | OUTPATIENT
Start: 2023-08-25 | End: 2023-08-25

## 2023-08-25 RX ORDER — SODIUM CHLORIDE 9 MG/ML
1000 INJECTION, SOLUTION INTRAVENOUS
Refills: 0 | Status: DISCONTINUED | OUTPATIENT
Start: 2023-08-25 | End: 2023-08-25

## 2023-08-25 RX ORDER — CEFAZOLIN SODIUM 1 G
2000 VIAL (EA) INJECTION EVERY 8 HOURS
Refills: 0 | Status: COMPLETED | OUTPATIENT
Start: 2023-08-25 | End: 2023-08-25

## 2023-08-25 RX ORDER — TAMSULOSIN HYDROCHLORIDE 0.4 MG/1
1 CAPSULE ORAL
Refills: 0 | DISCHARGE

## 2023-08-25 RX ORDER — CELECOXIB 200 MG/1
200 CAPSULE ORAL ONCE
Refills: 0 | Status: COMPLETED | OUTPATIENT
Start: 2023-08-25 | End: 2023-08-25

## 2023-08-25 RX ADMIN — SENNA PLUS 2 TABLET(S): 8.6 TABLET ORAL at 21:29

## 2023-08-25 RX ADMIN — Medication 650 MILLIGRAM(S): at 18:36

## 2023-08-25 RX ADMIN — OXYCODONE HYDROCHLORIDE 5 MILLIGRAM(S): 5 TABLET ORAL at 17:53

## 2023-08-25 RX ADMIN — HYDROMORPHONE HYDROCHLORIDE 0.5 MILLIGRAM(S): 2 INJECTION INTRAMUSCULAR; INTRAVENOUS; SUBCUTANEOUS at 11:35

## 2023-08-25 RX ADMIN — OXYCODONE HYDROCHLORIDE 5 MILLIGRAM(S): 5 TABLET ORAL at 14:25

## 2023-08-25 RX ADMIN — OXYCODONE HYDROCHLORIDE 5 MILLIGRAM(S): 5 TABLET ORAL at 13:25

## 2023-08-25 RX ADMIN — OXYCODONE HYDROCHLORIDE 5 MILLIGRAM(S): 5 TABLET ORAL at 18:53

## 2023-08-25 RX ADMIN — Medication 3 MILLIGRAM(S): at 21:29

## 2023-08-25 RX ADMIN — SODIUM CHLORIDE 115 MILLILITER(S): 9 INJECTION, SOLUTION INTRAVENOUS at 21:29

## 2023-08-25 RX ADMIN — Medication 100 MILLIGRAM(S): at 16:04

## 2023-08-25 RX ADMIN — POLYETHYLENE GLYCOL 3350 17 GRAM(S): 17 POWDER, FOR SOLUTION ORAL at 21:29

## 2023-08-25 RX ADMIN — Medication 650 MILLIGRAM(S): at 07:14

## 2023-08-25 RX ADMIN — TAMSULOSIN HYDROCHLORIDE 0.4 MILLIGRAM(S): 0.4 CAPSULE ORAL at 21:29

## 2023-08-25 RX ADMIN — SODIUM CHLORIDE 115 MILLILITER(S): 9 INJECTION, SOLUTION INTRAVENOUS at 12:25

## 2023-08-25 RX ADMIN — Medication 650 MILLIGRAM(S): at 17:36

## 2023-08-25 RX ADMIN — SODIUM CHLORIDE 50 MILLILITER(S): 9 INJECTION, SOLUTION INTRAVENOUS at 10:03

## 2023-08-25 RX ADMIN — Medication 500 MILLIGRAM(S): at 17:36

## 2023-08-25 RX ADMIN — HYDROMORPHONE HYDROCHLORIDE 0.5 MILLIGRAM(S): 2 INJECTION INTRAMUSCULAR; INTRAVENOUS; SUBCUTANEOUS at 11:05

## 2023-08-25 RX ADMIN — CELECOXIB 200 MILLIGRAM(S): 200 CAPSULE ORAL at 07:14

## 2023-08-25 RX ADMIN — SODIUM CHLORIDE 3 MILLILITER(S): 9 INJECTION INTRAMUSCULAR; INTRAVENOUS; SUBCUTANEOUS at 07:26

## 2023-08-25 NOTE — PROGRESS NOTE ADULT - SUBJECTIVE AND OBJECTIVE BOX
Post op Note  Patient is s/p L LUCILA anterior under spinal anesthesia. Pt tolerated procedure well without any intra-op complications. Pt doing well at this time. Denies CP/SOB/Dizziness/N/V/D/HA. Pain is controlled.     Vital Signs Last 24 Hrs  T(C): 36.4 (25 Aug 2023 12:05), Max: 36.7 (25 Aug 2023 06:50)  T(F): 97.5 (25 Aug 2023 12:05), Max: 98 (25 Aug 2023 06:50)  HR: 80 (25 Aug 2023 12:05) (63 - 108)  BP: 143/82 (25 Aug 2023 12:05) (89/71 - 161/140)  BP(mean): --  RR: 16 (25 Aug 2023 12:05) (10 - 23)  SpO2: 97% (25 Aug 2023 12:05) (97% - 100%)    Parameters below as of 25 Aug 2023 12:05  Patient On (Oxygen Delivery Method): room air        GEN: NAD  LLE: Dressing C/D/I.  B/L LE's: Motor intact + EHL/FHL/TA/GS in the BL LE. Sensation is grossly intact B/L. Extremities warm B/L. Compartments soft, compressible B/L, no calf tenderness B/L. DP 2+ B/L.    Labs:                          13.1   15.71 )-----------( 208      ( 25 Aug 2023 12:40 )             39.0       08-25    140  |  108  |  28<H>  ----------------------------<  137<H>  4.1   |  27  |  1.13    Ca    8.8      25 Aug 2023 12:40        A/P: Patient is a 77y y/o Male s/p L Anterior LUCILA, POD #0  -wound care, isometric exercises, GI motility, new medications, hospital course and discharge planning reviewed with pt  -Pain control/analgesia  -Inc spirometry reviewed and counseled  -SCD's to B/L LE  -F/U AM Labs  -PT/OT/WBAT, Posterior hip precautions,   -Antibiotic 24hours post-op  -Anticoagulation: ASA BID

## 2023-08-25 NOTE — BRIEF OPERATIVE NOTE - NSICDXBRIEFPROCEDURE_GEN_ALL_CORE_FT
PROCEDURES:  Total replacement of hip joint by anterior approach 25-Aug-2023 10:17:58  Lawrence Anaya

## 2023-08-25 NOTE — CONSULT NOTE ADULT - SUBJECTIVE AND OBJECTIVE BOX
LULI BYRD is a 77y Male s/p LEFT ANTERIOR TOTAL HIP REPLACEMENT WITH IMAGE      w/ h/o Hypertension    BPH (benign prostatic hyperplasia)    Osteoarthritis of left hip      denies any chest pain shortness of breath palpitation dizziness lightheadedness nausea vomiting fever or chills    History of carpal tunnel repair      No pertinent family history in first degree relatives      SH: doesnot smoke or drink at this time    No Known Allergies    acetaminophen     Tablet .. 650 milliGRAM(s) Oral every 6 hours  acetaminophen   IVPB .. 1000 milliGRAM(s) IV Intermittent once  ascorbic acid 500 milliGRAM(s) Oral two times a day  ceFAZolin   IVPB 2000 milliGRAM(s) IV Intermittent every 8 hours  enalapril 5 milliGRAM(s) Oral daily  hydrochlorothiazide 12.5 milliGRAM(s) Oral daily  HYDROmorphone  Injectable 0.5 milliGRAM(s) IV Push every 3 hours PRN  lactated ringers. 1000 milliLiter(s) IV Continuous <Continuous>  melatonin 3 milliGRAM(s) Oral at bedtime PRN  multivitamin 1 Tablet(s) Oral daily  ondansetron Injectable 4 milliGRAM(s) IV Push every 6 hours PRN  oxyCODONE    IR 5 milliGRAM(s) Oral every 4 hours PRN  oxyCODONE    IR 10 milliGRAM(s) Oral every 4 hours PRN  pantoprazole    Tablet 40 milliGRAM(s) Oral before breakfast  polyethylene glycol 3350 17 Gram(s) Oral at bedtime  senna 2 Tablet(s) Oral at bedtime  tamsulosin 0.4 milliGRAM(s) Oral at bedtime    T(C): 36.5 (08-25-23 @ 15:05), Max: 36.7 (08-25-23 @ 06:50)  HR: 82 (08-25-23 @ 15:05) (63 - 108)  BP: 125/79 (08-25-23 @ 15:05) (89/71 - 161/140)  RR: 17 (08-25-23 @ 15:05) (10 - 23)  SpO2: 97% (08-25-23 @ 15:05) (96% - 100%)  HEENT unremarkable  neck no JVD or bruit  heart normal S1 S2 RRR no gallops or rubs  chest clear to auscultation  abd sof nontender non distended +bs  ext no calf tenderness    A/P   DVT PX  pain control  bowel regimen   wound care as per ortho  GI PX  antiemetics prn  incentive spirometer

## 2023-08-25 NOTE — PATIENT PROFILE ADULT - FUNCTIONAL ASSESSMENT - BASIC MOBILITY 4.
Attempted to call patient back to inform her of the below information from Dr. Byron Prasad. No answer. Dickson Pandya MD           3:56 PM   Note      She may need a steroid pack to decrease the neck inflammation. I placed the order.  She would need a d 4 = No assist / stand by assistance

## 2023-08-25 NOTE — PATIENT PROFILE ADULT - FUNCTIONAL ASSESSMENT - BASIC MOBILITY 6.
Infusion complete. Flushed VAD with 30 ml of saline and 5 ml of heparin flush. D/C gramajo needle. Band aid to site. Patient stable. No complaints discharged to home with sister.
Labs within limits to treat. Treatment plan released. No complaints. Will continue to monitor.
Premedications given. No complaints.
VAD accessed per protocol with 3/4 inch 20 gauge gramajo needle. Flushed easily with saline 10 ml. Good blood return. Labs drawn. Secured accessed port with transparent dressing. IV infusing NSS. Here with  and sister. No complaints. Allowed questions with answers and reviewed her diagnosis and staging with her. She verbalized understanding. Support need and given.
4 = No assist / stand by assistance

## 2023-08-25 NOTE — DISCHARGE NOTE PROVIDER - HOSPITAL COURSE
77yMale with history of osteoarthritis of left hip presenting for left anterior LUCILA by Dr. Shelton Mensah on 8/25/23. Risk and benefits of surgery were explained to the patient. The patient understood and agreed to proceed with surgery. Patient underwent the procedure with no intraoperative complications. Pt was brought in stable condition to the PACU. Once stable in PACU, pt was brought to the floor. During hospital stay pt was followed by Medicine,  during this admission. Pt hospital course was XX. Pt is stable for discharge to XX on POD# 77yMale with history of osteoarthritis of left hip presenting for left anterior LUCILA by Dr. Shelton Mensah on 8/25/23. Risk and benefits of surgery were explained to the patient. The patient understood and agreed to proceed with surgery. Patient underwent the procedure with no intraoperative complications. Pt was brought in stable condition to the PACU. Once stable in PACU, pt was brought to the floor. During hospital stay pt was followed by Medicine,  during this admission. Patient had an uneventful hospital course. Stable to [place] home.

## 2023-08-25 NOTE — OCCUPATIONAL THERAPY INITIAL EVALUATION ADULT - ADDITIONAL COMMENTS
Pre op assessment - Pt lives with his wife and daughter (whom can provide assist upon D/C home) in a private home, no entry step, 1 flight of stairs inside c B/L rails(reachable). Pt has a walk-in shower stall with a retractable shower head, standard toilet seat height, & no grab bar.

## 2023-08-25 NOTE — DISCHARGE NOTE PROVIDER - NSDCFUSCHEDAPPT_GEN_ALL_CORE_FT
[facility]  [address]   Scheduled Appointment: 08/25/2023    [facility]  [address] [facility]  Scheduled Appointment: 09/07/2023     [facility]  [address] [facility]  Scheduled Appointment: 09/07/2023

## 2023-08-25 NOTE — DISCHARGE NOTE PROVIDER - CARE PROVIDER_API CALL
[facility]  Orthopaedic Surgery  [address], [address]  [place] [address]  Phone: [phone]  Fax: [phone]  Follow Up Time:

## 2023-08-25 NOTE — PATIENT PROFILE ADULT - FALL HARM RISK - UNIVERSAL INTERVENTIONS
Bed in lowest position, wheels locked, appropriate side rails in place/Call bell, personal items and telephone in reach/Instruct patient to call for assistance before getting out of bed or chair/Non-slip footwear when patient is out of bed/[place] to call system/Physically safe environment - no spills, clutter or unnecessary equipment/Purposeful Proactive Rounding/Room/bathroom lighting operational, light cord in reach

## 2023-08-25 NOTE — DISCHARGE NOTE PROVIDER - NSDCMRMEDTOKEN_GEN_ALL_CORE_FT
enalapril-hydrochlorothiazide 5 mg-12.5 mg oral tablet: 1 orally once a day  Flomax 0.4 mg oral capsule: 1 orally once a day   ascorbic acid 500 mg oral tablet: 1 tab(s) orally 2 times a day  aspirin 81 mg oral delayed release tablet: 1 tab(s) orally 2 times a day To prevent DVT MDD: 2  celecoxib 200 mg oral capsule: 1 cap(s) orally every 12 hours x 30 days MDD: 2  enalapril-hydrochlorothiazide 5 mg-12.5 mg oral tablet: 1 orally once a day  Flomax 0.4 mg oral capsule: 1 orally once a day  Multiple Vitamins oral tablet: 1 tab(s) orally once a day  Narcan 4 mg/0.1 mL nasal spray: 4 milligram(s) intranasally once , repeat as necessary.   Required with Opioid Rx  As needed. For suspected opiate overdose   Follow instructions on packet MDD: 0.2 ml  oxyCODONE 5 mg oral tablet: 1 tab(s) orally every 4 hours as needed for  moderate pain 2 tabs for severe pain MDD: 10  pantoprazole 40 mg oral delayed release tablet: 1 tab(s) orally once a day MDD: 1  senna leaf extract oral tablet: 2 tab(s) orally once a day (at bedtime)

## 2023-08-25 NOTE — DISCHARGE NOTE PROVIDER - NSDCFUADDINST_GEN_ALL_CORE_FT
Anterior Hip replacement precautions:   Elevate the leg (while keeping hip precautions) as often as possible to help control swelling. Incentive spirometer 10X/hr.   Keep Prineo Dressing Clean, Dry and Intact. May shower with Prineo Dressing. Please do not scrub, soak, peel or pick at the prineo dressing. No creams, lotions, or oils over dressing. May shower and let water run over incision, no baths. Pat dry once out of shower. Dressing to be removed in office at follow up visit in 2 weeks. There are no staples or stitches that need to be removed.

## 2023-08-25 NOTE — ASU PREOP CHECKLIST - SKIN PREP
Some unsteadiness, cognitive slowness, and headache likely secondary to concussion.  At this point, we will sign off.  Please let us know if we can be of additional service.   done

## 2023-08-25 NOTE — DISCHARGE NOTE PROVIDER - NSDCFUADDAPPT_GEN_ALL_CORE_FT
Follow up with your surgeon in two weeks. Call for appointment.    If you need more pain medication, call your surgeon's office. For medication refills or authorizations, please call [phone]    We recommend that you call and schedule a follow up appointment with your primary care physician for repeat blood work (CBC and BMP) for post hospital discharge follow-up care 2-4 weeks after your surgery.     Make sure to have a bowel movement by 2 days after surgery. Take stool softeners and laxatives as needed.     Call your surgeon if you have increased redness/pain/drainage or fever. Return to ER for shortness of breath/calf tenderness.

## 2023-08-25 NOTE — PHYSICAL THERAPY INITIAL EVALUATION ADULT - PERTINENT HX OF CURRENT PROBLEM, REHAB EVAL
Instructions for Myringotomy Tubes (Ear Tubes)    Recovery - The placement of ear tubes is a brief operation, and therefore the recovery from the anesthetic is usually less than a day.  However, in young children the sleep patterns, feeding, and behavior can be altered for several days.  Try to return to the daily routine as soon as possible and this issue will resolve without problems.  There are no restrictions on diet or activity after ear tube placement.  A low grade fever (up to 101 degrees) is not unusual in the day after tubes are placed.  Treat this with Acetaminophen (Tylenol) or Ibuprofen (Advil).  If the fever is higher, or does not respond to medication, call our office or call our nurse line after hours.  A small amount of drainage from the ears can occur for the first few days after ear tubes are placed, and this is perfectly normal, continue the ear drops as directed and it will clear up.  If drainage occurs after discontinued use of the ear drops, please call our office.    Medications - Children and adults can return to all preoperative medications after this procedure, including blood thinners.  You were sent home with ear drops, please use them as directed to assist in the rapid healing of the ear drum around the tube.  Pain medication may have been sent home with you, but a vast majority of the time, over-the-counter Tylenol or Ibuprofen is sufficient.    Water Precautions - Please maintain water precautions for the first week following ear tube placement.  A small cotton ball can be placed in the ear canal to prevent water from getting into the ear during bathing and showering. After one week it is okay to allow shower water down the ear canal. In addition, water from chlorinated swimming pools is okay after one week. However, please prevent water from lakes, rivers, streams, and ocean from getting in ears while the tubes are in place, as ear infections can occur.    Follow up - Approximately  4-6 weeks after the tubes are placed I like to examine the ears to make sure things have healed and the tubes are working properly.   Depending on the situation, a hearing test may or may not be performed at that time.  Afterwards, follow up is done every 6-12 months, but earlier if there are any issues or problems.    Advantages of Tubes - After ear tube placement, there are certain benefits from having a direct communication of the middle ear space with the ear canal.  In the event of drainage from the ears with ear tubes in place ( which is common with colds and flus ) use the ear drops you were discharged home with using the same dosage and instructions.  This will clear up the ears without the need for oral antibiotics a majority of the time.  Another advantage is that with tubes in place, the ears automatically adjust to changes in atmospheric pressure ( such as in airplanes or elevation ).  In other words, if the tubes are open the ears will not hurt or pop!    If there are any questions or issues with the above, or if there are other issues that concern you, always feel free to call the clinic and I am happy to speak with you as soon as I can.    Oswaldo Vasquez MD   [phone]    After hours and weekends please call # [phone]    [facility]  Same-Day Surgery   Orders & Instructions for Your Child    For 24 to 48 hours after surgery:    Your child should get plenty of rest.  Avoid strenuous play.  Offer reading, coloring and other light activities.   Your child may go back to a regular diet.  Offer light meals at first.   If your child has nausea (feels sick to the stomach) or vomiting (throws up):  Offer clear liquids such as apple juice, flat soda pop, Jell-O, Popsicles, Gatorade and clear soups.  Be sure your child drinks enough fluids.  Move to a normal diet as your child is able.   Your child may feel dizzy or sleepy.  He or she should avoid activities that required balance  (riding a bike or skateboard, climbing stairs, skating).  A slight fever is normal.  Call the doctor if the fever is over 100 F (37.7 C) (taken under the tongue) or lasts longer than 24 hours.  Your child may have a dry mouth, sore throat, muscle aches or nightmares.  These should go away within 24 hours.  A responsible adult must stay with the child.  All caregivers should get a copy of these instructions.  Do not make important or legal decisions.   Call your doctor for any of the followin.  Signs of infection (fever, growing tenderness at the surgery site, a large amount of drainage or bleeding, severe pain, foul-smelling drainage, redness, swelling).    2. It has been over 8 to 10 hours since surgery and your child is still not able to urinate (pass water) or is complaining about not being able to urinate.    Tylenol given at 0745 AM     Pt is a 76 yo M with hx of L hip OA and pain.

## 2023-08-26 ENCOUNTER — TRANSCRIPTION ENCOUNTER (OUTPATIENT)
Age: 78
End: 2023-08-26

## 2023-08-26 VITALS — DIASTOLIC BLOOD PRESSURE: 74 MMHG | SYSTOLIC BLOOD PRESSURE: 135 MMHG | OXYGEN SATURATION: 95 % | HEART RATE: 86 BPM

## 2023-08-26 LAB
ANION GAP SERPL CALC-SCNC: 4 MMOL/L — LOW (ref 5–17)
BUN SERPL-MCNC: 26 MG/DL — HIGH (ref 7–23)
CALCIUM SERPL-MCNC: 8.9 MG/DL — SIGNIFICANT CHANGE UP (ref 8.5–10.1)
CHLORIDE SERPL-SCNC: 106 MMOL/L — SIGNIFICANT CHANGE UP (ref 96–108)
CO2 SERPL-SCNC: 27 MMOL/L — SIGNIFICANT CHANGE UP (ref 22–31)
CREAT SERPL-MCNC: 1.17 MG/DL — SIGNIFICANT CHANGE UP (ref 0.5–1.3)
EGFR: 63 ML/MIN/1.73M2 — SIGNIFICANT CHANGE UP
GLUCOSE SERPL-MCNC: 138 MG/DL — HIGH (ref 70–99)
HCT VFR BLD CALC: 38.1 % — LOW (ref 39–50)
HGB BLD-MCNC: 12.6 G/DL — LOW (ref 13–17)
MCHC RBC-ENTMCNC: 30.8 PG — SIGNIFICANT CHANGE UP (ref 27–34)
MCHC RBC-ENTMCNC: 33.1 G/DL — SIGNIFICANT CHANGE UP (ref 32–36)
MCV RBC AUTO: 93.2 FL — SIGNIFICANT CHANGE UP (ref 80–100)
NRBC # BLD: 0 /100 WBCS — SIGNIFICANT CHANGE UP (ref 0–0)
PLATELET # BLD AUTO: 206 K/UL — SIGNIFICANT CHANGE UP (ref 150–400)
POTASSIUM SERPL-MCNC: 4.3 MMOL/L — SIGNIFICANT CHANGE UP (ref 3.5–5.3)
POTASSIUM SERPL-SCNC: 4.3 MMOL/L — SIGNIFICANT CHANGE UP (ref 3.5–5.3)
RBC # BLD: 4.09 M/UL — LOW (ref 4.2–5.8)
RBC # FLD: 13 % — SIGNIFICANT CHANGE UP (ref 10.3–14.5)
SODIUM SERPL-SCNC: 137 MMOL/L — SIGNIFICANT CHANGE UP (ref 135–145)
WBC # BLD: 11.66 K/UL — HIGH (ref 3.8–10.5)
WBC # FLD AUTO: 11.66 K/UL — HIGH (ref 3.8–10.5)

## 2023-08-26 RX ADMIN — Medication 650 MILLIGRAM(S): at 01:10

## 2023-08-26 RX ADMIN — OXYCODONE HYDROCHLORIDE 10 MILLIGRAM(S): 5 TABLET ORAL at 06:08

## 2023-08-26 RX ADMIN — Medication 650 MILLIGRAM(S): at 05:42

## 2023-08-26 RX ADMIN — Medication 650 MILLIGRAM(S): at 12:16

## 2023-08-26 RX ADMIN — Medication 81 MILLIGRAM(S): at 05:41

## 2023-08-26 RX ADMIN — Medication 650 MILLIGRAM(S): at 11:20

## 2023-08-26 RX ADMIN — OXYCODONE HYDROCHLORIDE 5 MILLIGRAM(S): 5 TABLET ORAL at 12:16

## 2023-08-26 RX ADMIN — CELECOXIB 200 MILLIGRAM(S): 200 CAPSULE ORAL at 06:40

## 2023-08-26 RX ADMIN — Medication 102 MILLIGRAM(S): at 05:40

## 2023-08-26 RX ADMIN — Medication 650 MILLIGRAM(S): at 00:15

## 2023-08-26 RX ADMIN — Medication 5 MILLIGRAM(S): at 05:41

## 2023-08-26 RX ADMIN — OXYCODONE HYDROCHLORIDE 5 MILLIGRAM(S): 5 TABLET ORAL at 11:22

## 2023-08-26 RX ADMIN — PANTOPRAZOLE SODIUM 40 MILLIGRAM(S): 20 TABLET, DELAYED RELEASE ORAL at 05:41

## 2023-08-26 RX ADMIN — Medication 100 MILLIGRAM(S): at 00:15

## 2023-08-26 RX ADMIN — Medication 650 MILLIGRAM(S): at 06:40

## 2023-08-26 RX ADMIN — OXYCODONE HYDROCHLORIDE 10 MILLIGRAM(S): 5 TABLET ORAL at 07:00

## 2023-08-26 RX ADMIN — Medication 500 MILLIGRAM(S): at 05:41

## 2023-08-26 RX ADMIN — Medication 1 TABLET(S): at 11:20

## 2023-08-26 RX ADMIN — CELECOXIB 200 MILLIGRAM(S): 200 CAPSULE ORAL at 05:41

## 2023-08-26 NOTE — PROGRESS NOTE ADULT - SUBJECTIVE AND OBJECTIVE BOX
Post op Note  Patient is s/p L LUCILA anterior under spinal anesthesia. Pt tolerated procedure well without any intra-op complications. Pt doing well at this time. Denies CP/SOB/Dizziness/N/V/D/HA. Pain is controlled.     LABS:                        12.6   11.66 )-----------( 206      ( 26 Aug 2023 06:05 )             38.1     08-26    137  |  106  |  26<H>  ----------------------------<  138<H>  4.3   |  27  |  1.17    Ca    8.9      26 Aug 2023 06:05        Urinalysis Basic - ( 26 Aug 2023 06:05 )    Color: x / Appearance: x / SG: x / pH: x  Gluc: 138 mg/dL / Ketone: x  / Bili: x / Urobili: x   Blood: x / Protein: x / Nitrite: x   Leuk Esterase: x / RBC: x / WBC x   Sq Epi: x / Non Sq Epi: x / Bacteria: x        VITAL SIGNS:  T(C): 36.4 (08-26-23 @ 04:45), Max: 36.6 (08-25-23 @ 20:24)  HR: 75 (08-26-23 @ 04:45) (63 - 83)  BP: 147/67 (08-26-23 @ 04:45) (89/71 - 161/140)  RR: 16 (08-26-23 @ 04:45) (10 - 23)  SpO2: 97% (08-26-23 @ 04:45) (96% - 100%)        GEN: NAD  LLE: Dressing C/D/I.  Motor intact + Q/EHL/FHL/TA/GS. Sensation is grossly intact. SILT anne-marie/saph/sp/dp/tib. Compartments soft, compressible B/L, no calf tenderness B/L. DP palpable.          A/P: Patient is a 77y y/o Male s/p L Anterior LUCILA, POD #1  -wound care, isometric exercises, GI motility, new medications, hospital course and discharge planning reviewed with pt  -Pain control/analgesia  -Inc spirometry reviewed and counseled  -SCD's to B/L LE  -F/U AM Labs  -PT/OT/WBAT, anterior hip precautions,   -Antibiotic 24hours post-op  -Anticoagulation: ASA BID  -dispo: home today

## 2023-08-26 NOTE — DISCHARGE NOTE NURSING/CASE MANAGEMENT/SOCIAL WORK - NSDCFUADDAPPT_GEN_ALL_CORE_FT
Follow up with your surgeon in two weeks. Call for appointment.    If you need more pain medication, call your surgeon's office. For medication refills or authorizations, please call [phone]    We recommend that you call and schedule a follow up appointment with your primary care physician for repeat blood work (CBC and BMP) for post hospital discharge follow-up care 2-4 weeks after your surgery.     Make sure to have a bowel movement by 2 days after surgery. Take stool softeners and laxatives as needed.     Call your surgeon if you have increased redness/pain/drainage or fever. Return to ER for shortness of breath/calf tenderness.

## 2023-08-26 NOTE — DISCHARGE NOTE NURSING/CASE MANAGEMENT/SOCIAL WORK - PATIENT PORTAL LINK FT
You can access the FollowMyHealth Patient Portal offered by [facility] by registering at the following website: [email or web address]. By joining [employer]’s FollowMyHealth portal, you will also be able to view your health information using other applications (apps) compatible with our system.

## 2023-09-02 DIAGNOSIS — M16.12 UNILATERAL PRIMARY OSTEOARTHRITIS, LEFT HIP: ICD-10-CM

## 2023-09-07 ENCOUNTER — APPOINTMENT (OUTPATIENT)
Dept: ORTHOPEDIC SURGERY | Facility: CLINIC | Age: 78
End: 2023-09-07
Payer: MEDICARE

## 2023-09-07 PROCEDURE — 99024 POSTOP FOLLOW-UP VISIT: CPT

## 2023-09-07 PROCEDURE — 73502 X-RAY EXAM HIP UNI 2-3 VIEWS: CPT

## 2023-09-07 RX ORDER — OXYCODONE 5 MG/1
5 TABLET ORAL
Qty: 25 | Refills: 0 | Status: ACTIVE | COMMUNITY
Start: 2023-09-07 | End: 1900-01-01

## 2023-09-07 NOTE — HISTORY OF PRESENT ILLNESS
[] : Post Surgical Visit: yes [6] : 6 [de-identified] : 2 weeks s/p L LUCILA, pain improving, using oxy at night, finished home PT, ambulating with a cane, happy with his progress, denies n/v/f/c. [FreeTextEntry1] : Left hip.  [de-identified] : PT-X A WEEK ( HOME) , MED [de-identified] : 8/25/23 [de-identified] : Left LUCILA

## 2023-09-07 NOTE — ASSESSMENT
[FreeTextEntry1] : 78M 2 weeks s/p L LUCILA  Begin outpt PT Progress activities as tolerated retrun 4 weeks   We discussed the patient's progress. The patient was reminded of their hip dislocation precautions and their antibiotic prophylaxis. We discussed continued physical therapy and/or a home exercise program. Questions about their hip replacement and future follow up were answered and discussed.

## 2023-09-07 NOTE — PHYSICAL EXAM
[2+] : dorsalis pedis pulse: 2+ [] : light touch intact throughout [Left] : left hip with pelvis [There are no fractures, subluxations or dislocations. No significant abnormalities are seen] : There are no fractures, subluxations or dislocations. No significant abnormalities are seen [No loss of surgical correlation. Bony alignment acceptable. Hardware in appropriate position] : No loss of surgical correlation. Bony alignment acceptable. Hardware in appropriate position

## 2023-10-05 ENCOUNTER — APPOINTMENT (OUTPATIENT)
Dept: ORTHOPEDIC SURGERY | Facility: CLINIC | Age: 78
End: 2023-10-05
Payer: MEDICARE

## 2023-10-05 VITALS — WEIGHT: 185 LBS | BODY MASS INDEX: 29.03 KG/M2 | HEIGHT: 67 IN

## 2023-10-05 PROCEDURE — 99024 POSTOP FOLLOW-UP VISIT: CPT

## 2023-10-17 ENCOUNTER — RX RENEWAL (OUTPATIENT)
Age: 78
End: 2023-10-17

## 2023-10-18 ENCOUNTER — APPOINTMENT (OUTPATIENT)
Dept: INTERNAL MEDICINE | Facility: CLINIC | Age: 78
End: 2023-10-18
Payer: MEDICARE

## 2023-10-18 VITALS
HEIGHT: 67 IN | SYSTOLIC BLOOD PRESSURE: 124 MMHG | BODY MASS INDEX: 30.92 KG/M2 | DIASTOLIC BLOOD PRESSURE: 80 MMHG | WEIGHT: 197 LBS

## 2023-10-18 DIAGNOSIS — R53.83 OTHER FATIGUE: ICD-10-CM

## 2023-10-18 PROCEDURE — 36415 COLL VENOUS BLD VENIPUNCTURE: CPT

## 2023-10-18 PROCEDURE — 99214 OFFICE O/P EST MOD 30 MIN: CPT | Mod: 25

## 2023-10-19 ENCOUNTER — TRANSCRIPTION ENCOUNTER (OUTPATIENT)
Age: 78
End: 2023-10-19

## 2023-10-19 LAB
ALBUMIN SERPL ELPH-MCNC: 4.2 G/DL
ALP BLD-CCNC: 161 U/L
ALT SERPL-CCNC: 28 U/L
ANION GAP SERPL CALC-SCNC: 13 MMOL/L
AST SERPL-CCNC: 30 U/L
BASOPHILS # BLD AUTO: 0.08 K/UL
BASOPHILS NFR BLD AUTO: 1.1 %
BILIRUB SERPL-MCNC: 0.5 MG/DL
BUN SERPL-MCNC: 32 MG/DL
CALCIUM SERPL-MCNC: 9.4 MG/DL
CHLORIDE SERPL-SCNC: 102 MMOL/L
CO2 SERPL-SCNC: 20 MMOL/L
CREAT SERPL-MCNC: 1.21 MG/DL
EGFR: 61 ML/MIN/1.73M2
EOSINOPHIL # BLD AUTO: 0.29 K/UL
EOSINOPHIL NFR BLD AUTO: 4 %
GLUCOSE SERPL-MCNC: 116 MG/DL
HCT VFR BLD CALC: 40.2 %
HGB BLD-MCNC: 13 G/DL
IMM GRANULOCYTES NFR BLD AUTO: 0.3 %
LYMPHOCYTES # BLD AUTO: 2.34 K/UL
LYMPHOCYTES NFR BLD AUTO: 32.1 %
MAN DIFF?: NORMAL
MCHC RBC-ENTMCNC: 31.3 PG
MCHC RBC-ENTMCNC: 32.3 GM/DL
MCV RBC AUTO: 96.9 FL
MONOCYTES # BLD AUTO: 0.7 K/UL
MONOCYTES NFR BLD AUTO: 9.6 %
NEUTROPHILS # BLD AUTO: 3.86 K/UL
NEUTROPHILS NFR BLD AUTO: 52.9 %
PLATELET # BLD AUTO: 282 K/UL
POTASSIUM SERPL-SCNC: 5.1 MMOL/L
PROT SERPL-MCNC: 7.3 G/DL
RBC # BLD: 4.15 M/UL
RBC # FLD: 13.4 %
SODIUM SERPL-SCNC: 135 MMOL/L
T4 SERPL-MCNC: 5.4 UG/DL
TSH SERPL-ACNC: 2.16 UIU/ML
WBC # FLD AUTO: 7.29 K/UL

## 2023-11-17 NOTE — REASON FOR VISIT
CM spoke with wife, Margarita Lesches, today regarding DCP- Margarita Lesches Bre requested referrals to [email or web address] and [facility] sent via [address] [place] [address] Court. [employer] liaison will come to do bedside eval in the morning and f/u with CM.     Thank you  Cat Peter Leyva RN, BSN, [facility]   [phone] [FreeTextEntry2] : follow up

## 2023-12-14 ENCOUNTER — APPOINTMENT (OUTPATIENT)
Dept: ORTHOPEDIC SURGERY | Facility: CLINIC | Age: 78
End: 2023-12-14
Payer: MEDICARE

## 2023-12-14 VITALS — BODY MASS INDEX: 30.92 KG/M2 | HEIGHT: 67 IN | WEIGHT: 197 LBS

## 2023-12-14 PROCEDURE — 73502 X-RAY EXAM HIP UNI 2-3 VIEWS: CPT

## 2023-12-14 PROCEDURE — 99213 OFFICE O/P EST LOW 20 MIN: CPT

## 2023-12-14 NOTE — HISTORY OF PRESENT ILLNESS
[2] : 2 [0] : 0 [Retired] : Work status: retired [de-identified] : 4mos s/p L LUCILA, no pain, done with PT, ambulating unassisted, denies n/v/f/c. [de-identified] : p/t

## 2023-12-14 NOTE — PHYSICAL EXAM
[2+] : dorsalis pedis pulse: 2+ [Left] : left hip with pelvis [There are no fractures, subluxations or dislocations. No significant abnormalities are seen] : There are no fractures, subluxations or dislocations. No significant abnormalities are seen [No loss of surgical correlation. Bony alignment acceptable. Hardware in appropriate position] : No loss of surgical correlation. Bony alignment acceptable. Hardware in appropriate position [] : no groin pain with internal rotation

## 2023-12-14 NOTE — ASSESSMENT
[FreeTextEntry1] : 78M 4mo s/p L LUCILA  Continue HEP activities as tolerated return 1 year post op   We discussed the patient's progress. The patient was reminded of their hip dislocation precautions and their antibiotic prophylaxis. We discussed continued physical therapy and/or a home exercise program. Questions about their hip replacement and future follow up were answered and discussed.

## 2023-12-22 NOTE — OCCUPATIONAL THERAPY INITIAL EVALUATION ADULT - GROSSLY INTACT, SENSORY
Patient Quality Outreach    Patient is due for the following:   Colon Cancer Screening    Next Steps:   Schedule a Annual Wellness Visit    Type of outreach:    Sent [employer] message.      Questions for provider review:    None           Nahomy Dolan        
Grossly Intact

## 2024-03-20 ENCOUNTER — APPOINTMENT (OUTPATIENT)
Dept: INTERNAL MEDICINE | Facility: CLINIC | Age: 79
End: 2024-03-20
Payer: MEDICARE

## 2024-03-20 VITALS
WEIGHT: 189 LBS | SYSTOLIC BLOOD PRESSURE: 126 MMHG | HEIGHT: 67 IN | BODY MASS INDEX: 29.66 KG/M2 | DIASTOLIC BLOOD PRESSURE: 74 MMHG

## 2024-03-20 DIAGNOSIS — Z00.00 ENCOUNTER FOR GENERAL ADULT MEDICAL EXAMINATION W/OUT ABNORMAL FINDINGS: ICD-10-CM

## 2024-03-20 DIAGNOSIS — E11.9 TYPE 2 DIABETES MELLITUS W/OUT COMPLICATIONS: ICD-10-CM

## 2024-03-20 DIAGNOSIS — E78.5 HYPERLIPIDEMIA, UNSPECIFIED: ICD-10-CM

## 2024-03-20 DIAGNOSIS — I10 ESSENTIAL (PRIMARY) HYPERTENSION: ICD-10-CM

## 2024-03-20 DIAGNOSIS — N42.31 PROSTATIC INTRAEPITHELIAL NEOPLASIA: ICD-10-CM

## 2024-03-20 PROCEDURE — G0439: CPT

## 2024-03-20 PROCEDURE — 36415 COLL VENOUS BLD VENIPUNCTURE: CPT

## 2024-03-20 NOTE — HEALTH RISK ASSESSMENT
[No] : No [No falls in past year] : Patient reported no falls in the past year [0] : 2) Feeling down, depressed, or hopeless: Not at all (0) [PHQ-2 Negative - No further assessment needed] : PHQ-2 Negative - No further assessment needed [ATG4Gozvo] : 0 [Never] : Never

## 2024-03-20 NOTE — REVIEW OF SYSTEMS
[Fever] : no fever [Chills] : no chills [Chest Pain] : no chest pain [Palpitations] : no palpitations [Lower Ext Edema] : no lower extremity edema [Shortness Of Breath] : shortness of breath [Dyspnea on Exertion] : not dyspnea on exertion [Cough] : no cough [Abdominal Pain] : no abdominal pain [Muscle Weakness] : no muscle weakness [Headache] : no headache [Dizziness] : no dizziness

## 2024-03-20 NOTE — ASSESSMENT
[FreeTextEntry1] : His exam is unremarkable/unchanged.  Hypertension-blood pressure remains well-controlled on Lotrel 5/40 daily.  Kwrtsvptkqryyr-acklgo-ve lipid profile sent.  Not present medication.  Bvzqfzyyuon-vnkvzv-tm A1c was sent.  History of BPH/PIN-a follow-up PSA was sent.  He has been asymptomatic.

## 2024-03-20 NOTE — HISTORY OF PRESENT ILLNESS
[de-identified] : 78-year-old male presents for annual wellness visit along with follow-up fasting labs and prescription renewals. He has a history of hypertension, mild hyperlipidemia, early diabetes and BPH/PIN. He does follow regularly with urology.  Doing well since having left hip replacement. Has been generally well without any recent illness.

## 2024-03-20 NOTE — PHYSICAL EXAM
[No Acute Distress] : no acute distress [No JVD] : no jugular venous distention [No Respiratory Distress] : no respiratory distress  [Clear to Auscultation] : lungs were clear to auscultation bilaterally [Normal Rate] : normal rate  [Regular Rhythm] : with a regular rhythm [No Murmur] : no murmur heard [No Edema] : there was no peripheral edema [No Focal Deficits] : no focal deficits [Alert and Oriented x3] : oriented to person, place, and time [de-identified] : Benign

## 2024-03-21 ENCOUNTER — APPOINTMENT (OUTPATIENT)
Dept: ORTHOPEDIC SURGERY | Facility: CLINIC | Age: 79
End: 2024-03-21
Payer: MEDICARE

## 2024-03-21 VITALS — BODY MASS INDEX: 29.66 KG/M2 | HEIGHT: 67 IN | WEIGHT: 189 LBS

## 2024-03-21 DIAGNOSIS — Z96.642 PRESENCE OF LEFT ARTIFICIAL HIP JOINT: ICD-10-CM

## 2024-03-21 LAB
ALBUMIN SERPL ELPH-MCNC: 4.5 G/DL
ALP BLD-CCNC: 120 U/L
ALT SERPL-CCNC: 34 U/L
ANION GAP SERPL CALC-SCNC: 13 MMOL/L
AST SERPL-CCNC: 33 U/L
BASOPHILS # BLD AUTO: 0.07 K/UL
BASOPHILS NFR BLD AUTO: 1 %
BILIRUB SERPL-MCNC: 0.7 MG/DL
BUN SERPL-MCNC: 28 MG/DL
CALCIUM SERPL-MCNC: 9.8 MG/DL
CHLORIDE SERPL-SCNC: 99 MMOL/L
CHOLEST SERPL-MCNC: 194 MG/DL
CO2 SERPL-SCNC: 21 MMOL/L
CREAT SERPL-MCNC: 1.31 MG/DL
EGFR: 55 ML/MIN/1.73M2
EOSINOPHIL # BLD AUTO: 0.21 K/UL
EOSINOPHIL NFR BLD AUTO: 3 %
ESTIMATED AVERAGE GLUCOSE: 154 MG/DL
GLUCOSE SERPL-MCNC: 140 MG/DL
HBA1C MFR BLD HPLC: 7 %
HCT VFR BLD CALC: 44.8 %
HDLC SERPL-MCNC: 34 MG/DL
HGB BLD-MCNC: 14.8 G/DL
IMM GRANULOCYTES NFR BLD AUTO: 0.4 %
LDLC SERPL CALC-MCNC: 106 MG/DL
LYMPHOCYTES # BLD AUTO: 1.98 K/UL
LYMPHOCYTES NFR BLD AUTO: 28.7 %
MAN DIFF?: NORMAL
MCHC RBC-ENTMCNC: 31.1 PG
MCHC RBC-ENTMCNC: 33 GM/DL
MCV RBC AUTO: 94.1 FL
MONOCYTES # BLD AUTO: 0.8 K/UL
MONOCYTES NFR BLD AUTO: 11.6 %
NEUTROPHILS # BLD AUTO: 3.82 K/UL
NEUTROPHILS NFR BLD AUTO: 55.3 %
NONHDLC SERPL-MCNC: 160 MG/DL
PLATELET # BLD AUTO: 256 K/UL
POTASSIUM SERPL-SCNC: 4.9 MMOL/L
PROT SERPL-MCNC: 7.4 G/DL
PSA SERPL-MCNC: 2.36 NG/ML
RBC # BLD: 4.76 M/UL
RBC # FLD: 13.9 %
SODIUM SERPL-SCNC: 133 MMOL/L
TRIGL SERPL-MCNC: 312 MG/DL
WBC # FLD AUTO: 6.91 K/UL

## 2024-03-21 PROCEDURE — 73502 X-RAY EXAM HIP UNI 2-3 VIEWS: CPT

## 2024-03-21 PROCEDURE — 99213 OFFICE O/P EST LOW 20 MIN: CPT

## 2024-03-21 NOTE — HISTORY OF PRESENT ILLNESS
[2] : 2 [0] : 0 [Retired] : Work status: retired [de-identified] : 7mos s/p L LUCILA, no pain, done with PT, ambulating unassisted, denies n/v/f/c. [de-identified] : no treatment

## 2024-03-21 NOTE — ASSESSMENT
[FreeTextEntry1] : 78M 7mo s/p L LUCILA  Continue HEP activities as tolerated return 1 year post op   We discussed the patient's progress. The patient was reminded of their hip dislocation precautions and their antibiotic prophylaxis. We discussed continued physical therapy and/or a home exercise program. Questions about their hip replacement and future follow up were answered and discussed.

## 2024-04-15 ENCOUNTER — RX RENEWAL (OUTPATIENT)
Age: 79
End: 2024-04-15

## 2024-04-15 RX ORDER — AMLODIPINE BESYLATE AND BENAZEPRIL HYDROCHLORIDE 5; 40 MG/1; MG/1
5-40 CAPSULE ORAL
Qty: 90 | Refills: 1 | Status: ACTIVE | COMMUNITY
Start: 2021-09-21 | End: 1900-01-01

## 2024-08-22 ENCOUNTER — APPOINTMENT (OUTPATIENT)
Dept: ORTHOPEDIC SURGERY | Facility: CLINIC | Age: 79
End: 2024-08-22
Payer: MEDICARE

## 2024-08-22 VITALS — WEIGHT: 180 LBS | BODY MASS INDEX: 28.25 KG/M2 | HEIGHT: 67 IN

## 2024-08-22 DIAGNOSIS — Z96.642 PRESENCE OF LEFT ARTIFICIAL HIP JOINT: ICD-10-CM

## 2024-08-22 PROCEDURE — 73502 X-RAY EXAM HIP UNI 2-3 VIEWS: CPT

## 2024-08-22 PROCEDURE — 99213 OFFICE O/P EST LOW 20 MIN: CPT

## 2024-08-22 NOTE — HISTORY OF PRESENT ILLNESS
[3] : 3 [0] : 0 [Retired] : Work status: retired [de-identified] : 1 years/p L LUCILA, no pain, done with PT, ambulating unassisted, denies n/v/f/c. [FreeTextEntry6] : leg cramps [de-identified] : none

## 2024-08-22 NOTE — ASSESSMENT
[FreeTextEntry1] : 78M 1yr s/p L LUCILA  Continue HEP activities as tolerated return 1 year post op   We discussed the patient's progress. The patient was reminded of their hip dislocation precautions and their antibiotic prophylaxis. We discussed continued physical therapy and/or a home exercise program. Questions about their hip replacement and future follow up were answered and discussed.

## 2024-10-11 ENCOUNTER — RX RENEWAL (OUTPATIENT)
Age: 79
End: 2024-10-11

## 2024-10-30 ENCOUNTER — APPOINTMENT (OUTPATIENT)
Dept: INTERNAL MEDICINE | Facility: CLINIC | Age: 79
End: 2024-10-30
Payer: MEDICARE

## 2024-10-30 VITALS
BODY MASS INDEX: 29.03 KG/M2 | DIASTOLIC BLOOD PRESSURE: 74 MMHG | HEIGHT: 67 IN | WEIGHT: 185 LBS | SYSTOLIC BLOOD PRESSURE: 118 MMHG

## 2024-10-30 DIAGNOSIS — E78.5 HYPERLIPIDEMIA, UNSPECIFIED: ICD-10-CM

## 2024-10-30 DIAGNOSIS — E11.9 TYPE 2 DIABETES MELLITUS W/OUT COMPLICATIONS: ICD-10-CM

## 2024-10-30 DIAGNOSIS — I10 ESSENTIAL (PRIMARY) HYPERTENSION: ICD-10-CM

## 2024-10-30 PROCEDURE — 36415 COLL VENOUS BLD VENIPUNCTURE: CPT

## 2024-10-30 PROCEDURE — 99214 OFFICE O/P EST MOD 30 MIN: CPT

## 2024-10-30 PROCEDURE — G2211 COMPLEX E/M VISIT ADD ON: CPT

## 2024-10-31 LAB
ALBUMIN SERPL ELPH-MCNC: 4.6 G/DL
ALP BLD-CCNC: 133 U/L
ALT SERPL-CCNC: 44 U/L
ANION GAP SERPL CALC-SCNC: 14 MMOL/L
AST SERPL-CCNC: 37 U/L
BASOPHILS # BLD AUTO: 0.12 K/UL
BASOPHILS NFR BLD AUTO: 1.5 %
BILIRUB SERPL-MCNC: 0.6 MG/DL
BUN SERPL-MCNC: 26 MG/DL
CALCIUM SERPL-MCNC: 10 MG/DL
CHLORIDE SERPL-SCNC: 101 MMOL/L
CHOLEST SERPL-MCNC: 206 MG/DL
CO2 SERPL-SCNC: 24 MMOL/L
CREAT SERPL-MCNC: 1.26 MG/DL
EGFR: 57 ML/MIN/1.73M2
EOSINOPHIL # BLD AUTO: 0.44 K/UL
EOSINOPHIL NFR BLD AUTO: 5.4 %
ESTIMATED AVERAGE GLUCOSE: 151 MG/DL
GLUCOSE SERPL-MCNC: 136 MG/DL
HBA1C MFR BLD HPLC: 6.9 %
HCT VFR BLD CALC: 46.1 %
HDLC SERPL-MCNC: 33 MG/DL
HGB BLD-MCNC: 14.9 G/DL
IMM GRANULOCYTES NFR BLD AUTO: 0.5 %
LDLC SERPL CALC-MCNC: 117 MG/DL
LYMPHOCYTES # BLD AUTO: 2.94 K/UL
LYMPHOCYTES NFR BLD AUTO: 35.8 %
MAN DIFF?: NORMAL
MCHC RBC-ENTMCNC: 31.7 PG
MCHC RBC-ENTMCNC: 32.3 G/DL
MCV RBC AUTO: 98.1 FL
MONOCYTES # BLD AUTO: 0.68 K/UL
MONOCYTES NFR BLD AUTO: 8.3 %
NEUTROPHILS # BLD AUTO: 3.99 K/UL
NEUTROPHILS NFR BLD AUTO: 48.5 %
NONHDLC SERPL-MCNC: 173 MG/DL
PLATELET # BLD AUTO: 274 K/UL
POTASSIUM SERPL-SCNC: 5 MMOL/L
PROT SERPL-MCNC: 7.9 G/DL
RBC # BLD: 4.7 M/UL
RBC # FLD: 14.5 %
SODIUM SERPL-SCNC: 139 MMOL/L
TRIGL SERPL-MCNC: 322 MG/DL
WBC # FLD AUTO: 8.21 K/UL

## 2025-04-23 ENCOUNTER — APPOINTMENT (OUTPATIENT)
Dept: INTERNAL MEDICINE | Facility: CLINIC | Age: 80
End: 2025-04-23
Payer: MEDICARE

## 2025-04-23 VITALS
DIASTOLIC BLOOD PRESSURE: 68 MMHG | SYSTOLIC BLOOD PRESSURE: 124 MMHG | WEIGHT: 189 LBS | BODY MASS INDEX: 29.66 KG/M2 | HEIGHT: 67 IN

## 2025-04-23 DIAGNOSIS — Z00.00 ENCOUNTER FOR GENERAL ADULT MEDICAL EXAMINATION W/OUT ABNORMAL FINDINGS: ICD-10-CM

## 2025-04-23 DIAGNOSIS — E78.5 HYPERLIPIDEMIA, UNSPECIFIED: ICD-10-CM

## 2025-04-23 DIAGNOSIS — I10 ESSENTIAL (PRIMARY) HYPERTENSION: ICD-10-CM

## 2025-04-23 DIAGNOSIS — E11.9 TYPE 2 DIABETES MELLITUS W/OUT COMPLICATIONS: ICD-10-CM

## 2025-04-23 DIAGNOSIS — N40.0 BENIGN PROSTATIC HYPERPLASIA WITHOUT LOWER URINARY TRACT SYMPMS: ICD-10-CM

## 2025-04-23 PROCEDURE — G0439: CPT

## 2025-04-23 PROCEDURE — 36415 COLL VENOUS BLD VENIPUNCTURE: CPT

## 2025-04-24 LAB
ALBUMIN SERPL ELPH-MCNC: 4.4 G/DL
ALP BLD-CCNC: 118 U/L
ALT SERPL-CCNC: 35 U/L
ANION GAP SERPL CALC-SCNC: 14 MMOL/L
AST SERPL-CCNC: 30 U/L
BASOPHILS # BLD AUTO: 0.09 K/UL
BASOPHILS NFR BLD AUTO: 1.4 %
BILIRUB SERPL-MCNC: 0.8 MG/DL
BUN SERPL-MCNC: 26 MG/DL
CALCIUM SERPL-MCNC: 9.6 MG/DL
CHLORIDE SERPL-SCNC: 102 MMOL/L
CHOLEST SERPL-MCNC: 220 MG/DL
CO2 SERPL-SCNC: 21 MMOL/L
CREAT SERPL-MCNC: 1.17 MG/DL
EGFRCR SERPLBLD CKD-EPI 2021: 63 ML/MIN/1.73M2
EOSINOPHIL # BLD AUTO: 0.28 K/UL
EOSINOPHIL NFR BLD AUTO: 4.4 %
ESTIMATED AVERAGE GLUCOSE: 177 MG/DL
GLUCOSE SERPL-MCNC: 142 MG/DL
HBA1C MFR BLD HPLC: 7.8 %
HCT VFR BLD CALC: 49 %
HDLC SERPL-MCNC: 32 MG/DL
HGB BLD-MCNC: 15.6 G/DL
IMM GRANULOCYTES NFR BLD AUTO: 0.5 %
LDLC SERPL-MCNC: 129 MG/DL
LYMPHOCYTES # BLD AUTO: 1.93 K/UL
LYMPHOCYTES NFR BLD AUTO: 30 %
MAN DIFF?: NORMAL
MCHC RBC-ENTMCNC: 31.3 PG
MCHC RBC-ENTMCNC: 31.8 G/DL
MCV RBC AUTO: 98.2 FL
MONOCYTES # BLD AUTO: 0.7 K/UL
MONOCYTES NFR BLD AUTO: 10.9 %
NEUTROPHILS # BLD AUTO: 3.4 K/UL
NEUTROPHILS NFR BLD AUTO: 52.8 %
NONHDLC SERPL-MCNC: 187 MG/DL
PLATELET # BLD AUTO: 244 K/UL
POTASSIUM SERPL-SCNC: 4.9 MMOL/L
PROT SERPL-MCNC: 7.5 G/DL
PSA SERPL-MCNC: 4.84 NG/ML
RBC # BLD: 4.99 M/UL
RBC # FLD: 14 %
SODIUM SERPL-SCNC: 137 MMOL/L
TRIGL SERPL-MCNC: 324 MG/DL
WBC # FLD AUTO: 6.43 K/UL

## 2025-08-07 ENCOUNTER — RX RENEWAL (OUTPATIENT)
Age: 80
End: 2025-08-07

## 2025-08-28 ENCOUNTER — APPOINTMENT (OUTPATIENT)
Dept: ORTHOPEDIC SURGERY | Facility: CLINIC | Age: 80
End: 2025-08-28
Payer: MEDICARE

## 2025-08-28 DIAGNOSIS — Z96.642 PRESENCE OF LEFT ARTIFICIAL HIP JOINT: ICD-10-CM

## 2025-08-28 PROCEDURE — 99213 OFFICE O/P EST LOW 20 MIN: CPT

## 2025-08-28 PROCEDURE — 73502 X-RAY EXAM HIP UNI 2-3 VIEWS: CPT

## (undated) DEVICE — Device

## (undated) DEVICE — DRAPE TOWEL BLUE 17" X 24"

## (undated) DEVICE — ZIMMER PULSAVAC PLUS FAN KIT

## (undated) DEVICE — SOL IRR BAG NS 0.9% 3000ML

## (undated) DEVICE — TAPE SILK 1"

## (undated) DEVICE — DRAPE SHOWER CURTAIN ISOLATION

## (undated) DEVICE — SYR LUER LOK 20CC

## (undated) DEVICE — GLV 7.5 PROTEXIS ORTHO (BROWN)

## (undated) DEVICE — GOWN XL

## (undated) DEVICE — SAW BLADE STRYKER SAGITTAL DUAL CUT 18X90X1.19MM

## (undated) DEVICE — PACK TOTAL JOINT

## (undated) DEVICE — SUT VICRYL 0 36" CT-1 UNDYED

## (undated) DEVICE — SUT STRATAFIX SYMMETRIC PDS PLUS 1 18" CTX VIOLET

## (undated) DEVICE — DRSG WEBRIL 6"

## (undated) DEVICE — GLV 8 PROTEXIS (BLUE)

## (undated) DEVICE — WARMING BLANKET UPPER ADULT

## (undated) DEVICE — DRAPE 3/4 SHEET W REINFORCEMENT 56X77"

## (undated) DEVICE — HOOD T5 PEELAWAY

## (undated) DEVICE — PREP SCRUB BRUSH W CHG 4%

## (undated) DEVICE — SUT MONOCRYL 3-0 27" PS-2 UNDYED

## (undated) DEVICE — SUT ETHIBOND 5 4-30" V-37

## (undated) DEVICE — DRAPE IOBAN 33" X 23"

## (undated) DEVICE — SUT VICRYL 2-0 27" CT-1 UNDYED

## (undated) DEVICE — DRSG DERMABOND PRINEO 22CM

## (undated) DEVICE — FRA-ESU BOVIE FORCE TRIAD T6D04548DX: Type: DURABLE MEDICAL EQUIPMENT

## (undated) DEVICE — PREP CHLORAPREP HI-LITE ORANGE 26ML

## (undated) DEVICE — ELCTR AQUAMANTYS BIPOLAR SEALER 6.0

## (undated) DEVICE — VENODYNE/SCD SLEEVE CALF MEDIUM

## (undated) DEVICE — PACK BASIC

## (undated) DEVICE — GLV 7.5 PROTEXIS (WHITE)

## (undated) DEVICE — NDL HYPO SAFE 22G X 1.5" (BLACK)